# Patient Record
Sex: FEMALE | Race: BLACK OR AFRICAN AMERICAN | Employment: OTHER | URBAN - METROPOLITAN AREA
[De-identification: names, ages, dates, MRNs, and addresses within clinical notes are randomized per-mention and may not be internally consistent; named-entity substitution may affect disease eponyms.]

---

## 2017-12-03 ENCOUNTER — HOSPITAL ENCOUNTER (OUTPATIENT)
Facility: HOSPITAL | Age: 77
Setting detail: OBSERVATION
Discharge: HOME | End: 2017-12-04
Attending: EMERGENCY MEDICINE
Payer: MEDICARE

## 2017-12-03 DIAGNOSIS — E16.2 HYPOGLYCEMIA: Primary | ICD-10-CM

## 2017-12-03 PROCEDURE — 99220 INITIAL OBSERVATION CARE,LEVL III: CPT | Performed by: HOSPITALIST

## 2017-12-03 RX ORDER — FUROSEMIDE 20 MG/1
20 TABLET ORAL DAILY
Status: DISCONTINUED | OUTPATIENT
Start: 2017-12-04 | End: 2017-12-04

## 2017-12-03 RX ORDER — ONDANSETRON 2 MG/ML
4 INJECTION INTRAMUSCULAR; INTRAVENOUS EVERY 6 HOURS PRN
Status: DISCONTINUED | OUTPATIENT
Start: 2017-12-03 | End: 2017-12-04

## 2017-12-03 RX ORDER — PREGABALIN 75 MG/1
75 CAPSULE ORAL 2 TIMES DAILY
Status: DISCONTINUED | OUTPATIENT
Start: 2017-12-03 | End: 2017-12-04

## 2017-12-03 RX ORDER — PREGABALIN 75 MG/1
75 CAPSULE ORAL 2 TIMES DAILY
COMMUNITY

## 2017-12-03 RX ORDER — ACETAMINOPHEN 325 MG/1
650 TABLET ORAL EVERY 6 HOURS PRN
Status: DISCONTINUED | OUTPATIENT
Start: 2017-12-03 | End: 2017-12-04

## 2017-12-03 RX ORDER — AMLODIPINE BESYLATE 5 MG/1
5 TABLET ORAL 2 TIMES DAILY
COMMUNITY

## 2017-12-03 RX ORDER — ALENDRONATE SODIUM 35 MG/1
35 TABLET ORAL
COMMUNITY

## 2017-12-03 RX ORDER — GLIPIZIDE 10 MG/1
20 TABLET ORAL
COMMUNITY
End: 2017-12-04

## 2017-12-03 RX ORDER — SODIUM CHLORIDE 0.9 % (FLUSH) 0.9 %
3 SYRINGE (ML) INJECTION AS NEEDED
Status: DISCONTINUED | OUTPATIENT
Start: 2017-12-03 | End: 2017-12-04

## 2017-12-03 RX ORDER — CARVEDILOL 25 MG/1
25 TABLET ORAL 2 TIMES DAILY WITH MEALS
Status: DISCONTINUED | OUTPATIENT
Start: 2017-12-03 | End: 2017-12-04

## 2017-12-03 RX ORDER — AMLODIPINE BESYLATE 5 MG/1
5 TABLET ORAL 2 TIMES DAILY
Status: DISCONTINUED | OUTPATIENT
Start: 2017-12-03 | End: 2017-12-04

## 2017-12-03 RX ORDER — DEXTROSE MONOHYDRATE 25 G/50ML
50 INJECTION, SOLUTION INTRAVENOUS AS NEEDED
Status: DISCONTINUED | OUTPATIENT
Start: 2017-12-03 | End: 2017-12-04

## 2017-12-03 RX ORDER — CARVEDILOL 25 MG/1
25 TABLET ORAL 2 TIMES DAILY WITH MEALS
COMMUNITY

## 2017-12-03 RX ORDER — FUROSEMIDE 20 MG/1
20 TABLET ORAL DAILY
COMMUNITY

## 2017-12-03 RX ORDER — POTASSIUM CHLORIDE 20 MEQ/1
40 TABLET, EXTENDED RELEASE ORAL EVERY 4 HOURS
Status: COMPLETED | OUTPATIENT
Start: 2017-12-03 | End: 2017-12-04

## 2017-12-03 RX ORDER — DEXTROSE AND SODIUM CHLORIDE 5; .9 G/100ML; G/100ML
INJECTION, SOLUTION INTRAVENOUS ONCE
Status: COMPLETED | OUTPATIENT
Start: 2017-12-03 | End: 2017-12-04

## 2017-12-03 NOTE — ED INITIAL ASSESSMENT (HPI)
[facility]--patient with blood sugar 47 upon arrival, 1 amp d50 given.  AOx4 on arrival with no complaints    On pills and insulin for diabetes

## 2017-12-03 NOTE — ED PROVIDER NOTES
Patient Seen in: [facility] AND [facility] Emergency Department     History   Patient presents with:  Hypoglycemia (metabolic)    Stated Complaint: hypoglycemia    HPI    68year old female with history of diabetes on long-acting insulin as well as oral hypoglycem Head: Normocephalic and atraumatic. Head is without raccoon's eyes, without right periorbital erythema and without left periorbital erythema.    Nose: Nose normal.   Mouth/Throat: Mucous membranes are normal. Mucous membranes are not pale and not cyanotic DIFFERENTIAL WITH PLATELET.   Procedure                               Abnormality         Status                     ---------                               -----------         ------                     CBC W/ DIFFERENTIAL[[id number]]          Abnormal change.     Diagnostic Considerations and Interpretation of abnormal or significant results:  ED Course as of Dec 03 1040  ------------------------------------------------------------    Impression:  After review and interpretation of the above emergency de

## 2017-12-04 VITALS
TEMPERATURE: 98 F | HEART RATE: 73 BPM | SYSTOLIC BLOOD PRESSURE: 133 MMHG | BODY MASS INDEX: 29.81 KG/M2 | HEIGHT: 66.5 IN | DIASTOLIC BLOOD PRESSURE: 64 MMHG | WEIGHT: 187.69 LBS | RESPIRATION RATE: 16 BRPM | OXYGEN SATURATION: 95 %

## 2017-12-04 PROCEDURE — 99217 OBSERVATION CARE DISCHARGE: CPT | Performed by: HOSPITALIST

## 2017-12-04 NOTE — PLAN OF CARE
Problem: Patient/Family Goals  Goal: Patient/Family Long Term Goal  Patient's Long Term Goal:   Interventions:  -   - See additional Care Plan goals for specific interventions   Outcome: Progressing      Comments: Patient presents with stable vital signs.

## 2017-12-04 NOTE — CM/SW NOTE
YOAN met with the patient at bedside. She lives in a 2 story home with stairs. Her son lives with her and she is independent with her care. We discussed Zee Pennington for diabetes management and she is declining at this time.   She did agreed to outpatient diabetes

## 2017-12-04 NOTE — H&P
[facility]    History & Physical    Avila Gloss Patient Status:  Observation    1940 MRN [id number]   Location [address] [facility] Attending Arvin Traore MD   Hosp Day # 0 PCP Jaison Cervantes     Date:  12/3/2017  Date of Admi carvedilol 25 MG Oral Tab Take 25 mg by mouth 2 (two) times daily with meals. furosemide 20 MG Oral Tab Take 20 mg by mouth daily. AmLODIPine Besylate 5 MG Oral Tab Take 5 mg by mouth 2 (two) times daily.    Alendronate Sodium 35 MG Oral Tab Take 35 m

## 2017-12-04 NOTE — PLAN OF CARE
Patient/Family Goals    • Patient/Family Long Term Goal Completed    • Patient/Family Short Term Goal Completed        Pt currently resting in bed; VSS; hypoglycemia incident this morning, has resolved and BS good.  Pt will be D/C home today via a private v

## 2017-12-04 NOTE — DIABETES ED
[facility] - [facility]    Diabetes Education  Note    Daron Boas Cox Patient Status:  Observation   1940 MRN [id number]  Location [facility] Attending Sarath Tavarez MD  Hosp Day # 0  [address]    Reason for Visit: Hypoglycem

## 2017-12-05 NOTE — DISCHARGE SUMMARY
[facility]    Discharge Summary    Eusebio Sheehan Patient Status:  Observation    1940 MRN [id number]   Location [facility] Attending No att. providers found   Hosp Day # 0 KORI Lujan     Date of Admission: 12/3/2 check her blood sugars at home. Pt also revealed that her  recently  so she has not been eating much. Pt given D5 IVF in ER. Sugars recovered now. Stop glipizide and lanagliptin  Lantus dose decreased from 60 qhs to 20 units qhs.   Pt to f/ Information     Eleazar Castro In 1 week.     Specialty:  Internal Medicine  Contact information:  [facility] [place]  [phone]                   Hospital Discharge Diagnoses: Hypoglycemia    TCM Diagnosis at discharge from [facility] (DP/SNF)

## 2022-09-09 ENCOUNTER — APPOINTMENT (OUTPATIENT)
Dept: GENERAL RADIOLOGY | Facility: HOSPITAL | Age: 82
End: 2022-09-09
Attending: EMERGENCY MEDICINE
Payer: MEDICARE

## 2022-09-09 ENCOUNTER — HOSPITAL ENCOUNTER (EMERGENCY)
Facility: HOSPITAL | Age: 82
Discharge: HOME | End: 2022-09-09
Attending: EMERGENCY MEDICINE
Payer: MEDICARE

## 2022-09-09 VITALS
BODY MASS INDEX: 21 KG/M2 | OXYGEN SATURATION: 99 % | TEMPERATURE: 98 F | DIASTOLIC BLOOD PRESSURE: 70 MMHG | RESPIRATION RATE: 18 BRPM | SYSTOLIC BLOOD PRESSURE: 170 MMHG | HEART RATE: 75 BPM | WEIGHT: 130 LBS

## 2022-09-09 DIAGNOSIS — S16.1XXA STRAIN OF NECK MUSCLE, INITIAL ENCOUNTER: Primary | ICD-10-CM

## 2022-09-09 LAB
ANION GAP SERPL CALC-SCNC: 4 MMOL/L (ref 0–18)
BUN BLD-MCNC: 27 MG/DL (ref 7–18)
BUN/CREAT SERPL: 16.9 (ref 10–20)
CALCIUM BLD-MCNC: 9.3 MG/DL (ref 8.5–10.1)
CHLORIDE SERPL-SCNC: 106 MMOL/L (ref 98–112)
CO2 SERPL-SCNC: 28 MMOL/L (ref 21–32)
CREAT BLD-MCNC: 1.6 MG/DL
GFR SERPLBLD BASED ON 1.73 SQ M-ARVRAT: 32 ML/MIN/1.73M2 (ref 60–?)
GLUCOSE BLD-MCNC: 232 MG/DL (ref 70–99)
HCT VFR BLD AUTO: 44.3 %
HGB BLD-MCNC: 14.3 G/DL
OSMOLALITY SERPL CALC.SUM OF ELEC: 299 MOSM/KG (ref 275–295)
SODIUM SERPL-SCNC: 138 MMOL/L (ref 136–145)
TROPONIN I HIGH SENSITIVITY: 19 NG/L

## 2022-09-09 PROCEDURE — 96374 THER/PROPH/DIAG INJ IV PUSH: CPT

## 2022-09-09 PROCEDURE — 84484 ASSAY OF TROPONIN QUANT: CPT | Performed by: EMERGENCY MEDICINE

## 2022-09-09 PROCEDURE — 93010 ELECTROCARDIOGRAM REPORT: CPT | Performed by: EMERGENCY MEDICINE

## 2022-09-09 PROCEDURE — 85018 HEMOGLOBIN: CPT | Performed by: EMERGENCY MEDICINE

## 2022-09-09 PROCEDURE — 85014 HEMATOCRIT: CPT | Performed by: EMERGENCY MEDICINE

## 2022-09-09 PROCEDURE — 80048 BASIC METABOLIC PNL TOTAL CA: CPT | Performed by: EMERGENCY MEDICINE

## 2022-09-09 PROCEDURE — 93005 ELECTROCARDIOGRAM TRACING: CPT

## 2022-09-09 PROCEDURE — 72050 X-RAY EXAM NECK SPINE 4/5VWS: CPT | Performed by: EMERGENCY MEDICINE

## 2022-09-09 PROCEDURE — 99285 EMERGENCY DEPT VISIT HI MDM: CPT

## 2022-09-09 PROCEDURE — 99284 EMERGENCY DEPT VISIT MOD MDM: CPT

## 2022-09-09 RX ORDER — LIDOCAINE 50 MG/G
1 PATCH TOPICAL EVERY 24 HOURS
Qty: 5 PATCH | Refills: 0 | Status: SHIPPED | OUTPATIENT
Start: 2022-09-09 | End: 2022-09-14

## 2022-09-09 RX ORDER — ACETAMINOPHEN 325 MG/1
TABLET ORAL
Qty: 30 TABLET | Refills: 0 | Status: SHIPPED | OUTPATIENT
Start: 2022-09-09

## 2022-09-09 RX ORDER — LABETALOL HYDROCHLORIDE 5 MG/ML
15 INJECTION, SOLUTION INTRAVENOUS ONCE
Status: COMPLETED | OUTPATIENT
Start: 2022-09-09 | End: 2022-09-09

## 2022-09-09 RX ORDER — ACETAMINOPHEN 500 MG
1000 TABLET ORAL ONCE
Status: COMPLETED | OUTPATIENT
Start: 2022-09-09 | End: 2022-09-09

## 2022-09-09 NOTE — ED INITIAL ASSESSMENT (HPI)
Patient complains of right posterior neck pain that radiates to head x 3 days. Denies injury. A/o x 4, no deficits noted.

## 2023-07-01 ENCOUNTER — HOSPITAL ENCOUNTER (INPATIENT)
Facility: HOSPITAL | Age: 83
LOS: 6 days | Discharge: NURSING, REHAB OR LONG-TERM CARE | End: 2023-07-07
Attending: EMERGENCY MEDICINE | Admitting: INTERNAL MEDICINE
Payer: MEDICARE

## 2023-07-01 ENCOUNTER — APPOINTMENT (OUTPATIENT)
Dept: MRI IMAGING | Facility: HOSPITAL | Age: 83
End: 2023-07-01
Attending: INTERNAL MEDICINE
Payer: MEDICARE

## 2023-07-01 ENCOUNTER — APPOINTMENT (OUTPATIENT)
Dept: GENERAL RADIOLOGY | Facility: HOSPITAL | Age: 83
End: 2023-07-01
Attending: EMERGENCY MEDICINE
Payer: MEDICARE

## 2023-07-01 ENCOUNTER — APPOINTMENT (OUTPATIENT)
Dept: CT IMAGING | Facility: HOSPITAL | Age: 83
End: 2023-07-01
Attending: EMERGENCY MEDICINE
Payer: MEDICARE

## 2023-07-01 ENCOUNTER — APPOINTMENT (OUTPATIENT)
Dept: CV DIAGNOSTICS | Facility: HOSPITAL | Age: 83
End: 2023-07-01
Attending: STUDENT IN AN ORGANIZED HEALTH CARE EDUCATION/TRAINING PROGRAM
Payer: MEDICARE

## 2023-07-01 DIAGNOSIS — R09.89 SUSPECTED CEREBROVASCULAR ACCIDENT (CVA): Primary | ICD-10-CM

## 2023-07-01 LAB
ALBUMIN SERPL-MCNC: 3.6 G/DL (ref 3.4–5)
ALP LIVER SERPL-CCNC: 69 U/L
ALT SERPL-CCNC: 17 U/L
ANION GAP SERPL CALC-SCNC: 3 MMOL/L (ref 0–18)
APTT PPP: 30.6 SECONDS (ref 23.3–35.6)
AST SERPL-CCNC: 22 U/L (ref 15–37)
ATRIAL RATE: 73 BPM
BASOPHILS # BLD AUTO: 0.04 X10(3) UL (ref 0–0.2)
BASOPHILS NFR BLD AUTO: 0.8 %
BILIRUB DIRECT SERPL-MCNC: 0.2 MG/DL (ref 0–0.2)
BILIRUB SERPL-MCNC: 0.7 MG/DL (ref 0.1–2)
BUN BLD-MCNC: 24 MG/DL (ref 7–18)
BUN/CREAT SERPL: 17 (ref 10–20)
CALCIUM BLD-MCNC: 9.3 MG/DL (ref 8.5–10.1)
CHLORIDE SERPL-SCNC: 113 MMOL/L (ref 98–112)
CHOLEST SERPL-MCNC: 129 MG/DL (ref ?–200)
CO2 SERPL-SCNC: 28 MMOL/L (ref 21–32)
CREAT BLD-MCNC: 1.41 MG/DL
DEPRECATED RDW RBC AUTO: 47.7 FL (ref 35.1–46.3)
EOSINOPHIL # BLD AUTO: 0.14 X10(3) UL (ref 0–0.7)
EOSINOPHIL NFR BLD AUTO: 2.9 %
ERYTHROCYTE [DISTWIDTH] IN BLOOD BY AUTOMATED COUNT: 13.6 % (ref 11–15)
EST. AVERAGE GLUCOSE BLD GHB EST-MCNC: 177 MG/DL (ref 68–126)
GFR SERPLBLD BASED ON 1.73 SQ M-ARVRAT: 37 ML/MIN/1.73M2 (ref 60–?)
GLUCOSE BLD-MCNC: 164 MG/DL (ref 70–99)
GLUCOSE BLDC GLUCOMTR-MCNC: 117 MG/DL (ref 70–99)
GLUCOSE BLDC GLUCOMTR-MCNC: 140 MG/DL (ref 70–99)
GLUCOSE BLDC GLUCOMTR-MCNC: 144 MG/DL (ref 70–99)
GLUCOSE BLDC GLUCOMTR-MCNC: 162 MG/DL (ref 70–99)
GLUCOSE BLDC GLUCOMTR-MCNC: 162 MG/DL (ref 70–99)
GLUCOSE BLDC GLUCOMTR-MCNC: 167 MG/DL (ref 70–99)
HBA1C MFR BLD: 7.8 % (ref ?–5.7)
HCT VFR BLD AUTO: 43.6 %
HDLC SERPL-MCNC: 53 MG/DL (ref 40–59)
HGB BLD-MCNC: 13.5 G/DL
IMM GRANULOCYTES # BLD AUTO: 0.01 X10(3) UL (ref 0–1)
IMM GRANULOCYTES NFR BLD: 0.2 %
INR BLD: 1.08 (ref 0.85–1.16)
LDLC SERPL CALC-MCNC: 56 MG/DL (ref ?–100)
LYMPHOCYTES # BLD AUTO: 1.74 X10(3) UL (ref 1–4)
LYMPHOCYTES NFR BLD AUTO: 35.4 %
MCH RBC QN AUTO: 29.2 PG (ref 26–34)
MCHC RBC AUTO-ENTMCNC: 31 G/DL (ref 31–37)
MCV RBC AUTO: 94.4 FL
MONOCYTES # BLD AUTO: 0.66 X10(3) UL (ref 0.1–1)
MONOCYTES NFR BLD AUTO: 13.4 %
NEUTROPHILS # BLD AUTO: 2.32 X10 (3) UL (ref 1.5–7.7)
NEUTROPHILS # BLD AUTO: 2.32 X10(3) UL (ref 1.5–7.7)
NEUTROPHILS NFR BLD AUTO: 47.3 %
NONHDLC SERPL-MCNC: 76 MG/DL (ref ?–130)
OSMOLALITY SERPL CALC.SUM OF ELEC: 306 MOSM/KG (ref 275–295)
P AXIS: 55 DEGREES
P-R INTERVAL: 180 MS
PLATELET # BLD AUTO: 246 10(3)UL (ref 150–450)
POTASSIUM SERPL-SCNC: 3.8 MMOL/L (ref 3.5–5.1)
PROT SERPL-MCNC: 7.9 G/DL (ref 6.4–8.2)
PROTHROMBIN TIME: 13.9 SECONDS (ref 11.6–14.8)
Q-T INTERVAL: 422 MS
QRS DURATION: 100 MS
QTC CALCULATION (BEZET): 464 MS
R AXIS: -37 DEGREES
RBC # BLD AUTO: 4.62 X10(6)UL
SODIUM SERPL-SCNC: 144 MMOL/L (ref 136–145)
T AXIS: 181 DEGREES
TRIGL SERPL-MCNC: 111 MG/DL (ref 30–149)
TROPONIN I HIGH SENSITIVITY: 48 NG/L
VENTRICULAR RATE: 73 BPM
VLDLC SERPL CALC-MCNC: 16 MG/DL (ref 0–30)
WBC # BLD AUTO: 4.9 X10(3) UL (ref 4–11)

## 2023-07-01 PROCEDURE — 99223 1ST HOSP IP/OBS HIGH 75: CPT | Performed by: STUDENT IN AN ORGANIZED HEALTH CARE EDUCATION/TRAINING PROGRAM

## 2023-07-01 PROCEDURE — 70498 CT ANGIOGRAPHY NECK: CPT | Performed by: EMERGENCY MEDICINE

## 2023-07-01 PROCEDURE — 70496 CT ANGIOGRAPHY HEAD: CPT | Performed by: EMERGENCY MEDICINE

## 2023-07-01 PROCEDURE — 72100 X-RAY EXAM L-S SPINE 2/3 VWS: CPT | Performed by: EMERGENCY MEDICINE

## 2023-07-01 PROCEDURE — 70551 MRI BRAIN STEM W/O DYE: CPT | Performed by: INTERNAL MEDICINE

## 2023-07-01 RX ORDER — ROSUVASTATIN CALCIUM 10 MG/1
10 TABLET, COATED ORAL DAILY
Status: DISCONTINUED | OUTPATIENT
Start: 2023-07-01 | End: 2023-07-07

## 2023-07-01 RX ORDER — CLOPIDOGREL BISULFATE 75 MG/1
75 TABLET ORAL DAILY
Status: DISCONTINUED | OUTPATIENT
Start: 2023-07-02 | End: 2023-07-07

## 2023-07-01 RX ORDER — CLOPIDOGREL BISULFATE 75 MG/1
300 TABLET ORAL ONCE
Status: COMPLETED | OUTPATIENT
Start: 2023-07-01 | End: 2023-07-01

## 2023-07-01 RX ORDER — ACETAMINOPHEN 325 MG/1
650 TABLET ORAL EVERY 6 HOURS PRN
Status: DISCONTINUED | OUTPATIENT
Start: 2023-07-01 | End: 2023-07-07

## 2023-07-01 RX ORDER — NEBIVOLOL 5 MG/1
5 TABLET ORAL DAILY
Status: DISCONTINUED | OUTPATIENT
Start: 2023-07-01 | End: 2023-07-04

## 2023-07-01 RX ORDER — TIZANIDINE 2 MG/1
2 TABLET ORAL ONCE
COMMUNITY

## 2023-07-01 RX ORDER — GLIPIZIDE 10 MG/1
10 TABLET ORAL DAILY
COMMUNITY
Start: 2023-06-21 | End: 2023-07-07

## 2023-07-01 RX ORDER — ASPIRIN 81 MG/1
81 TABLET ORAL DAILY
Status: DISCONTINUED | OUTPATIENT
Start: 2023-07-02 | End: 2023-07-07

## 2023-07-01 RX ORDER — ROSUVASTATIN CALCIUM 10 MG/1
10 TABLET, COATED ORAL DAILY
COMMUNITY
Start: 2023-04-29

## 2023-07-01 RX ORDER — TIZANIDINE 2 MG/1
2 TABLET ORAL EVERY 8 HOURS PRN
Status: DISCONTINUED | OUTPATIENT
Start: 2023-07-01 | End: 2023-07-07

## 2023-07-01 RX ORDER — HEPARIN SODIUM 5000 [USP'U]/ML
5000 INJECTION, SOLUTION INTRAVENOUS; SUBCUTANEOUS EVERY 12 HOURS SCHEDULED
Status: DISCONTINUED | OUTPATIENT
Start: 2023-07-01 | End: 2023-07-01

## 2023-07-01 RX ORDER — ERGOCALCIFEROL 1.25 MG/1
50000 CAPSULE ORAL WEEKLY
COMMUNITY
Start: 2023-06-23

## 2023-07-01 RX ORDER — DILTIAZEM HYDROCHLORIDE 240 MG/1
240 CAPSULE, COATED, EXTENDED RELEASE ORAL 2 TIMES DAILY
COMMUNITY
Start: 2023-04-04 | End: 2023-07-07

## 2023-07-01 RX ORDER — POTASSIUM CHLORIDE 20 MEQ/1
40 TABLET, EXTENDED RELEASE ORAL ONCE
Status: COMPLETED | OUTPATIENT
Start: 2023-07-01 | End: 2023-07-01

## 2023-07-01 RX ORDER — PREGABALIN 75 MG/1
75 CAPSULE ORAL 2 TIMES DAILY
Status: DISCONTINUED | OUTPATIENT
Start: 2023-07-01 | End: 2023-07-07

## 2023-07-01 RX ORDER — NEBIVOLOL 5 MG/1
5 TABLET ORAL DAILY
COMMUNITY
End: 2023-07-07

## 2023-07-01 RX ORDER — ASPIRIN 81 MG/1
324 TABLET, CHEWABLE ORAL ONCE
Status: COMPLETED | OUTPATIENT
Start: 2023-07-01 | End: 2023-07-01

## 2023-07-01 RX ORDER — DILTIAZEM HYDROCHLORIDE 240 MG/1
240 CAPSULE, COATED, EXTENDED RELEASE ORAL 2 TIMES DAILY
Status: DISCONTINUED | OUTPATIENT
Start: 2023-07-01 | End: 2023-07-04

## 2023-07-01 RX ORDER — HEPARIN SODIUM 5000 [USP'U]/ML
5000 INJECTION, SOLUTION INTRAVENOUS; SUBCUTANEOUS EVERY 12 HOURS SCHEDULED
Status: DISCONTINUED | OUTPATIENT
Start: 2023-07-01 | End: 2023-07-07

## 2023-07-01 NOTE — ED INITIAL ASSESSMENT (HPI)
Patient arrive from home via EMS after she fell out of bed approx. 4ft. Patient c/o dizziness and turned over and fell out of bed. Stated she felt her right leg feel numb.  Felt fine when she went to bed around 10 pm.

## 2023-07-01 NOTE — ED QUICK NOTES
INDRA Lawson [phone] 6/30/23. Pt. Worden dizzy this AM and rolled and fell out of bed. Stated her right leg felt numb and could not stand.

## 2023-07-01 NOTE — SLP NOTE
ADULT SWALLOWING EVALUATION    ASSESSMENT    ASSESSMENT/OVERALL IMPRESSION:  PPE REQUIRED. THIS THERAPIST WORE GLOVES AND MASK FOR DURATION OF EVALUATION. HANDS WASHED UPON ENTRANCE/EXIT. SLP BSSE orders received and acknowledged. A swallow evaluation warranted per stroke protocol. Pt afebrile with clear vocal quality, on room air, with oxygen saturation at 93%. Pt with no hx of dysphagia at [facility]. Pt positioned 90 degrees in bed, alert/cooperative, family present. Oral motor skills appear within functional limits. Pt presented with trials of hard solids and thin liquids via straw. Pt with adequate oral acceptance and bilabial seal across all trials. Pt with intact bite, mastication of solids, and timely A-P transit. Pt's swallow response appears timely with adequate hyolaryngeal elevation/excursion. No clinical signs of aspiration (e.g., immediate/delayed throat clear, immediate/delayed cough, wet vocal quality, increased O2 effort) observed across all trials. No CXR on this admission. Oxygen status remained stable t/o the entire evaluation. At this time, pt presents with functional oral and probable pharyngeal swallow stages. Recommend a regular diet and thin liquids with strict adherence to safe swallowing compensatory strategies. Results and recommendations reviewed with RN and family. PLAN: SLP to f/u x1 meal assessment, monitor imaging, and VFSS if clinically indicated         RECOMMENDATIONS   Diet Recommendations - Solids: Regular  Diet Recommendations - Liquids: Thin Liquids                        Compensatory Strategies Recommended: Slow rate  Aspiration Precautions: Upright position; Slow rate  Medication Administration Recommendations: No restrictions  Treatment Plan/Recommendations: Aspiration precautions;Communication evaluation (pending MRI results)  Discharge Recommendations/Plan: Undetermined    HISTORY   MEDICAL HISTORY  Reason for Referral: Stroke protocol    Problem List  Principal Problem:    Suspected cerebrovascular accident (CVA)      Past Medical History  Past Medical History:   Diagnosis Date    Atherosclerosis of coronary artery     Breast cancer ([facility] [address]     Diabetes ([facility] [address]     Essential hypertension        Prior Living Situation: Home with support  Diet Prior to Admission: Regular; Thin liquids  Precautions: Aspiration    Patient/Family Goals: did not state this session    SWALLOWING HISTORY  Current Diet Consistency: Regular; Thin liquids  Dysphagia History: none  Imaging Results: MRI ordered    SUBJECTIVE       OBJECTIVE   ORAL MOTOR EXAMINATION  Dentition: Functional  Symmetry: Within Functional Limits  Strength: Within Functional Limits  Tone: Within Functional Limits  Range of Motion: Within Functional Limits  Rate of Motion: Within Functional Limits    Voice Quality: Clear  Respiratory Status: Unlabored  Consistencies Trialed: Thin liquids; Hard solid  Method of Presentation: Staff/Clinician assistance;Self presentation;Straw  Patient Positioning: Upright;Midline    Oral Phase of Swallow: Within Functional Limits                      Pharyngeal Phase of Swallow: Within Functional Limits           (Please note: Silent aspiration cannot be evaluated clinically. Videofluoroscopic Swallow Study is required to rule-out silent aspiration.)    Esophageal Phase of Swallow: No complaints consistent with possible esophageal involvement  Comments: NA              GOALS  Goal #1 The patient will tolerate regular consistency and thin liquids without overt signs or symptoms of aspiration with 100 % accuracy over 1 session(s). In Progress   Goal #2 The patient/family/caregiver will demonstrate understanding and implementation of aspiration precautions and swallow strategies independently over 1 session(s).     In Progress     FOLLOW UP  Treatment Plan/Recommendations: Aspiration precautions;Communication evaluation (pending MRI results)  Number of Visits to Meet Established Goals: 1  Follow Up Needed (Documentation Required): Yes  SLP Follow-up Date: 07/03/23    Thank you for your referral.   If you have any questions, please contact Danielle Ruth, MICHELE Parisi. [place]  Pathologist  Phone Number Ext. [phone]

## 2023-07-01 NOTE — CM/SW NOTE
07/01/23 1500   CM/YOAN Referral Data   Referral Source Physician   Reason for Referral Discharge planning   Informant Patient;Daughter   Medical Hx   Does patient have an established PCP? Yes  (Morse Henrietta)   Patient Info   Advanced directives? No   Information provided? No   Patient's Current Mental Status at Time of Assessment Alert;Oriented   Patient's [address]   Number of Levels in Home 2   Patient lives with WILVER; Son   Patient Status Prior to Admission   Independent with ADLs and Mobility Yes   Discharge Needs   Anticipated D/C needs Acute rehab     SW received MDO for discharge planning. Sw met with pt and daughter at bedside. Pt provided above information. Pt reports that she is is independent with ADLs, no hx of HH. Hx of rehab- unsure if it was [facility] or Acute. Pt was informed of therapy recs- Acute. Pt was informed that PMR MD has to agree with PT recs, then insurance Neelima Alvaro is needed. Pt is hesitant at this time, but is only agreeable to PMR Consult to see if PMR is agreeable with PT recs. No referrals sent at this time per patients request. Pt is understanding that it up to insurance to approve or deny. SW will follow up with pt and daughter tomorrow in regards to outcome of PMR consult. RN made aware of PMR consult, order placed    Plan: Pending medical clearance, DC to [facility], *PMR consult,     YOAN/ARMANDO to remain available for support and/or discharge planning.      Anupama Edward, MSW, LSW   x [phone]

## 2023-07-01 NOTE — PHYSICAL THERAPY NOTE
PHYSICAL THERAPY EVALUATION - INPATIENT     Room Number: 330/330-A  Evaluation Date: 7/1/2023  Type of Evaluation: Initial   Physician Order: PT Eval and Treat    Presenting Problem: suspected CVA     Reason for Therapy: Mobility Dysfunction and Discharge Planning    PHYSICAL THERAPY ASSESSMENT     Patient is a 80year old female admitted 7/1/2023 for suspected CVA. Patient's current functional deficits include weakness of RLE, impaired sensation of RLE, impaired coordination, impaired balance, bed mobility, transfers, gait, and stair navigation, which are below the patient's pre-admission status. Patient will benefit from continued IP PT services to address these deficits in preparation for discharge. RN approved participation with physical therapy. Pt was received resting in bed and agreeable to activity. Daughter and sister were at bedside. Educated on role of PT and PT plan of care, goals for this session. Pt verbalized understanding. Oriented to self, place, and stated date was July 2; easily reoriented. Per pt and family in the room, pt is currently at baseline mental status and no cognitive or speech changes present. Bed mobility: Mod A x 1 for supine to sit, Mod A x 1 scoot to EOB; most assistance required for movement of RLE off the bed and for trunk support. Initially with posterior lean; CGA to SBA for static and dynamic sitting at EOB. Remained seated at EOB for up to 10 minutes for vitals monitoring and to acclimate to position change. Pt did c/o slight dizziness upon sitting; BP taken at this time was 174/79. While sitting at EOB pt demonstrated grossly 4/5 strength of LLE and grossly 1/5 strength of RLE. Max A x 2 to return to supine. Transfers: Max A x 2 for sit to/from stand, x 3 trials. On initial STS, attempted without assistive device; required max A x 2 and only able to achieve partial lift from bed, <5 seconds before returning to sitting at EOB.  After seated rest break, attempt 2 more trials of STS with RW. Verbal cues and demonstration provided on safe hand placement and body mechanics prior to transfer. Able to achieve full stand with Max A x 2 and remained standing approximately 15 seconds on each stand. Cues given for upright posture and head position. Significant R sided lean noted and R knee buckling; pt unaware. RN requested for pt to return to bed due to being transported to MRI in the near future. Pt was left resting in bed with needs within reach, bed alarm on, handoff to RN complete. The patient's Approx Degree of Impairment: 76.75% has been calculated based on documentation in the [facility] '6 clicks' Inpatient Basic Mobility Short Form. Research supports that patients with this level of impairment may benefit from LTAC however PT recommends DC to acute rehab as pt was independent prior to admission and is currently well below her baseline, will benefit from intense skilled therapies to maximize independence and safety and facilitate safe return to independent PLOF. DISCHARGE RECOMMENDATIONS  PT Discharge Recommendations: Acute rehabilitation    PLAN  PT Treatment Plan: Bed mobility; Body mechanics; Endurance; Coordination; Energy conservation;Patient education;Gait training;Stair training;Transfer training;Balance training;Neuromuscular re-educate  Rehab Potential : Good  Frequency (Obs): Daily       PHYSICAL THERAPY MEDICAL/SOCIAL HISTORY     Problem List  Principal Problem:    Suspected cerebrovascular accident (CVA)    HOME SITUATION  Home Situation  Type of Home: House  Home Layout: Multi-level  Stairs to Enter : 1  Stairs to Bedroom: 6 (5 down to basement)  Railing: Yes  Lives With: Family (son and adult grandson)  Drives: No  Patient Owned Equipment: Cane  Patient Regularly Uses: None     Prior Level of [place]: Independent with all ADLs and mobility with a cane.      SUBJECTIVE  \"I did a lot of physical therapy in the past and it didn't do anything\"     PHYSICAL THERAPY EXAMINATION     OBJECTIVE  Precautions: Bed/chair alarm;Limb alert - right;Aspiration  Fall Risk: High fall risk    WEIGHT BEARING RESTRICTION  none    PAIN ASSESSMENT  Rating:  (did not rate)  Location: RLE  Management Techniques: Activity promotion; Body mechanics;Repositioning;Breathing techniques    COGNITION  Overall Cognitive Status:  WFL - within functional limits    RANGE OF MOTION AND STRENGTH ASSESSMENT  Upper extremity ROM and strength are within functional limits. Lower extremity ROM is within functional limits. Lower extremity strength is within functional limits. BALANCE  Static Sitting: Fair  Dynamic Sitting: Fair -  Static Standing: Poor  Dynamic Standing: Poor -    ACTIVITY TOLERANCE  Pulse: 69  Heart Rate Source: Monitor  BP: (!) 174/79 (169/75 after activity)  BP Location: Left arm  BP Method: Automatic  Patient Position: Sitting    AM-PAC '6-Clicks' INPATIENT SHORT FORM - BASIC MOBILITY  How much difficulty does the patient currently have. .. Patient Difficulty: Turning over in bed (including adjusting bedclothes, sheets and blankets)?: A Lot   Patient Difficulty: Sitting down on and standing up from a chair with arms (e.g., wheelchair, bedside commode, etc.): A Lot   Patient Difficulty: Moving from lying on back to sitting on the side of the bed?: A Lot   How much help from another person does the patient currently need. ..    Help from Another: Moving to and from a bed to a chair (including a wheelchair)?: A Lot   Help from Another: Need to walk in hospital room?: Total   Help from Another: Climbing 3-5 steps with a railing?: Total     AM-PAC Score:  Raw Score: 10   Approx Degree of Impairment: 76.75%   Standardized Score (AM-PAC Scale): 32.29   CMS Modifier (G-Code): CL    FUNCTIONAL ABILITY STATUS  Functional Mobility/Gait Assessment  Gait Assistance: Not tested    Bed Mobility: Mod A     Transfers: Max A x 2     Exercise/Education Provided:  Bed mobility  Body mechanics  Energy conservation  Functional activity tolerated  Gait training  Posture  Transfer training    Patient End of Session: In bed;Needs met;Call light within reach;RN aware of session/findings; All patient questions and concerns addressed; Alarm set; Family present    CURRENT GOALS    Goals to be met by: 7/8/23  Patient Goal Patient's self-stated goal is: go home   Goal #1 Patient is able to demonstrate supine - sit EOB @ level: minimum assistance     Goal #1   Current Status    Goal #2 Patient is able to demonstrate transfers Sit to/from Stand at assistance level: moderate assistance with walker - rolling     Goal #2  Current Status    Goal #3 Patient is able to ambulate 10 feet with assist device: walker - rolling at assistance level: moderate assistance   Goal #3   Current Status    Goal #4 Stair goal TBD   Goal #4   Current Status    Goal #5 Patient to demonstrate independence with home activity/exercise instructions provided to patient in preparation for discharge.    Goal #5   Current Status    Goal #6    Goal #6  Current Status      Patient Evaluation Complexity Level:  History Moderate - 1 or 2 personal factors and/or co-morbidities   Examination of body systems Moderate - addressing a total of 3 or more elements   Clinical Presentation Moderate - Evolving   Clinical Decision Making Moderate Complexity       Therapeutic Activity: 24 minutes

## 2023-07-01 NOTE — PLAN OF CARE
Patient is A&Ox4, 2Lo2, lift assist. NIH daily continuing Q4 neuros. Unable to move R lower extremity. Pt had MRI done today pending results. Plan for an echo tomorrow. Call light within reach and fall precautions in place. Problem: Patient Centered Care  Goal: Patient preferences are identified and integrated in the patient's plan of care  Description: Interventions:  - What would you like us to know as we care for you?  From home with son  - Provide timely, complete, and accurate information to patient/family  - Incorporate patient and family knowledge, values, beliefs, and cultural backgrounds into the planning and delivery of care  - Encourage patient/family to participate in care and decision-making at the level they choose  - Honor patient and family perspectives and choices  Outcome: Progressing     Problem: Diabetes/Glucose Control  Goal: Glucose maintained within prescribed range  Description: INTERVENTIONS:  - Monitor Blood Glucose as ordered  - Assess for signs and symptoms of hyperglycemia and hypoglycemia  - Administer ordered medications to maintain glucose within target range  - Assess barriers to adequate nutritional intake and initiate nutrition consult as needed  - Instruct patient on self management of diabetes  Outcome: Progressing     Problem: Patient/Family Goals  Goal: Patient/Family Long Term Goal  Description: Patient's Long Term Goal: To go back home    Interventions:  - Follow medical regimen  - See additional Care Plan goals for specific interventions  Outcome: Progressing  Goal: Patient/Family Short Term Goal  Description: Patient's Short Term Goal: To gain strength back    Interventions:   - Follow medical regimen  - work with PT/OT   - See additional Care Plan goals for specific interventions  Outcome: Progressing     Problem: NEUROLOGICAL - ADULT  Goal: Achieves stable or improved neurological status  Description: INTERVENTIONS  - Assess for and report changes in neurological status  - Initiate measures to prevent increased intracranial pressure  - Maintain blood pressure and fluid volume within ordered parameters to optimize cerebral perfusion and minimize risk of hemorrhage  - Monitor temperature, glucose, and sodium.  Initiate appropriate interventions as ordered  Outcome: Progressing  Goal: Achieves maximal functionality and self care  Description: INTERVENTIONS  - Monitor swallowing and airway patency with patient fatigue and changes in neurological status  - Encourage and assist patient to increase activity and self care with guidance from PT/OT  - Encourage visually impaired, hearing impaired and aphasic patients to use assistive/communication devices  Outcome: Progressing     Problem: Impaired Functional Mobility  Goal: Achieve highest/safest level of mobility/gait  Description: Interventions:  - Assess patient's functional ability and stability  - Promote increasing activity/tolerance for mobility and gait  - Educate and engage patient/family in tolerated activity level and precautions  Outcome: Progressing     Problem: Impaired Activities of Daily Living  Goal: Achieve highest/safest level of independence in self care  Description: Interventions:  - Assess ability and encourage patient to participate in ADLs to maximize function  - Promote sitting position while performing ADLs such as feeding, grooming, and bathing  - Educate and encourage patient/family in tolerated functional activity level and precautions during self-care  Outcome: Progressing

## 2023-07-01 NOTE — PLAN OF CARE
Pt admitted from ED in no acute distress, A&Ox4, on RA. VSS. Neuro check abnormal w/ inability to move RLE and decreased sensation in RLE, responds to pain. Passed bedside swallow eval. Plan: neuro eval, q2 neuros, daily NIH    Call light within reach, fall precautions in place  Problem: Patient Centered Care  Goal: Patient preferences are identified and integrated in the patient's plan of care  Description: Interventions:  - What would you like us to know as we care for you? From home with son  - Provide timely, complete, and accurate information to patient/family  - Incorporate patient and family knowledge, values, beliefs, and cultural backgrounds into the planning and delivery of care  - Encourage patient/family to participate in care and decision-making at the level they choose  - Honor patient and family perspectives and choices  Outcome: Progressing     Problem: Diabetes/Glucose Control  Goal: Glucose maintained within prescribed range  Description: INTERVENTIONS:  - Monitor Blood Glucose as ordered  - Assess for signs and symptoms of hyperglycemia and hypoglycemia  - Administer ordered medications to maintain glucose within target range  - Assess barriers to adequate nutritional intake and initiate nutrition consult as needed  - Instruct patient on self management of diabetes  Outcome: Progressing     Problem: NEUROLOGICAL - ADULT  Goal: Achieves stable or improved neurological status  Description: INTERVENTIONS  - Assess for and report changes in neurological status  - Initiate measures to prevent increased intracranial pressure  - Maintain blood pressure and fluid volume within ordered parameters to optimize cerebral perfusion and minimize risk of hemorrhage  - Monitor temperature, glucose, and sodium.  Initiate appropriate interventions as ordered  Outcome: Progressing  Goal: Achieves maximal functionality and self care  Description: INTERVENTIONS  - Monitor swallowing and airway patency with patient fatigue and changes in neurological status  - Encourage and assist patient to increase activity and self care with guidance from PT/OT  - Encourage visually impaired, hearing impaired and aphasic patients to use assistive/communication devices  Outcome: Progressing     Problem: Impaired Functional Mobility  Goal: Achieve highest/safest level of mobility/gait  Description: Interventions:  - Assess patient's functional ability and stability  - Promote increasing activity/tolerance for mobility and gait  - Educate and engage patient/family in tolerated activity level and precautions  - Recommend use of total lift for transfers  - Recommend use of chair position in bed 3 times per day  Outcome: Progressing     Problem: Impaired Activities of Daily Living  Goal: Achieve highest/safest level of independence in self care  Description: Interventions:  - Assess ability and encourage patient to participate in ADLs to maximize function  - Promote sitting position while performing ADLs such as feeding, grooming, and bathing  - Educate and encourage patient/family in tolerated functional activity level and precautions during self-care  - Encourage patient to incorporate impaired side during daily activities to promote function  Outcome: Progressing

## 2023-07-01 NOTE — PROGRESS NOTES
07/01/23 1212   Clinical Encounter Type   Visited With Patient and family together   Continue Visiting No   Referral From Nurse   Referral To    Rastafari Encounters   Rastafari Needs Pastoral care brochure   Patient Spiritual Encounters   Spiritual Assessment Completed Yes   Family Spiritual Encounters   Family Coping Sadness   Taxonomy   Intended Effects Lessen anxiety   Methods Explore spiritual/Voodoo beliefs; Offer spiritual/Voodoo support   Interventions Acknowledge current situation; Ask guided questions; Ask guided questions about cultural and Voodoo values; Share words of hope and inspiration;Prayer for healing;Provide Voodoo music     When  arrived Pt was resting with family at the bedside.  provide attentive listening, compassionate touch, music and prayer at the bedside. Pt was informed that Spiritual Care support can be requested via an Epic consult.

## 2023-07-01 NOTE — ED QUICK NOTES
Orders for admission, patient is aware of plan and ready to go upstairs. Any questions, please call ED RN Radha at extension [phone]. Patient Covid vaccination status: Partially vaccinated     COVID Test Ordered in ED: None    COVID Suspicion at Admission: N/A    Running Infusions:  None    Mental Status/LOC at time of transport: A&Ox4. Right sided weakness with right leg unable to move.      Other pertinent information:   CIWA score: N/A   NIH score:  4

## 2023-07-02 PROBLEM — R29.898 RIGHT LEG WEAKNESS: Status: ACTIVE | Noted: 2023-07-02

## 2023-07-02 PROBLEM — I63.9 ACUTE CVA (CEREBROVASCULAR ACCIDENT) (HCC): Status: ACTIVE | Noted: 2023-07-02

## 2023-07-02 LAB
GLUCOSE BLDC GLUCOMTR-MCNC: 105 MG/DL (ref 70–99)
GLUCOSE BLDC GLUCOMTR-MCNC: 223 MG/DL (ref 70–99)
GLUCOSE BLDC GLUCOMTR-MCNC: 254 MG/DL (ref 70–99)
GLUCOSE BLDC GLUCOMTR-MCNC: 282 MG/DL (ref 70–99)
POTASSIUM SERPL-SCNC: 3.8 MMOL/L (ref 3.5–5.1)

## 2023-07-02 PROCEDURE — 99233 SBSQ HOSP IP/OBS HIGH 50: CPT | Performed by: STUDENT IN AN ORGANIZED HEALTH CARE EDUCATION/TRAINING PROGRAM

## 2023-07-02 RX ORDER — POTASSIUM CHLORIDE 20 MEQ/1
40 TABLET, EXTENDED RELEASE ORAL ONCE
Status: COMPLETED | OUTPATIENT
Start: 2023-07-02 | End: 2023-07-02

## 2023-07-02 NOTE — PLAN OF CARE
No acute changes overnight. Problem: Patient Centered Care  Goal: Patient preferences are identified and integrated in the patient's plan of care  Description: Interventions:  - What would you like us to know as we care for you?  From home with son  - Provide timely, complete, and accurate information to patient/family  - Incorporate patient and family knowledge, values, beliefs, and cultural backgrounds into the planning and delivery of care  - Encourage patient/family to participate in care and decision-making at the level they choose  - Honor patient and family perspectives and choices  Outcome: Progressing     Problem: Diabetes/Glucose Control  Goal: Glucose maintained within prescribed range  Description: INTERVENTIONS:  - Monitor Blood Glucose as ordered  - Assess for signs and symptoms of hyperglycemia and hypoglycemia  - Administer ordered medications to maintain glucose within target range  - Assess barriers to adequate nutritional intake and initiate nutrition consult as needed  - Instruct patient on self management of diabetes  Outcome: Progressing     Problem: Patient/Family Goals  Goal: Patient/Family Long Term Goal  Description: Patient's Long Term Goal: To go back home    Interventions:  - Follow medical regimen  - See additional Care Plan goals for specific interventions  Outcome: Progressing  Goal: Patient/Family Short Term Goal  Description: Patient's Short Term Goal: To gain strength back    Interventions:   - Follow medical regimen  - work with PT/OT   - See additional Care Plan goals for specific interventions  Outcome: Progressing     Problem: NEUROLOGICAL - ADULT  Goal: Achieves stable or improved neurological status  Description: INTERVENTIONS  - Assess for and report changes in neurological status  - Initiate measures to prevent increased intracranial pressure  - Maintain blood pressure and fluid volume within ordered parameters to optimize cerebral perfusion and minimize risk of hemorrhage  - Monitor temperature, glucose, and sodium.  Initiate appropriate interventions as ordered  Outcome: Progressing  Goal: Achieves maximal functionality and self care  Description: INTERVENTIONS  - Monitor swallowing and airway patency with patient fatigue and changes in neurological status  - Encourage and assist patient to increase activity and self care with guidance from PT/OT  - Encourage visually impaired, hearing impaired and aphasic patients to use assistive/communication devices  Outcome: Progressing     Problem: Impaired Functional Mobility  Goal: Achieve highest/safest level of mobility/gait  Description: Interventions:  - Assess patient's functional ability and stability  - Promote increasing activity/tolerance for mobility and gait  - Educate and engage patient/family in tolerated activity level and precautions    Outcome: Progressing     Problem: Impaired Activities of Daily Living  Goal: Achieve highest/safest level of independence in self care  Description: Interventions:  - Assess ability and encourage patient to participate in ADLs to maximize function  - Promote sitting position while performing ADLs such as feeding, grooming, and bathing  - Educate and encourage patient/family in tolerated functional activity level and precautions during self-care    Outcome: Progressing

## 2023-07-02 NOTE — PROGRESS NOTES
Pt stated that she has a \"bump\" on her leg that hurts.  R outer part of pts thigh is a palpable bump that is painful to touch

## 2023-07-02 NOTE — PLAN OF CARE
AxOx4, RA, NSR, Cont of urine, incont of bowel. Max assist. RLE flaccid. Per Physical therapy pt was ortho static hypotension and dizzy when standing. Plan is SLP, ECHO, ACHS, Neuro Q4 and NIH   Problem: Patient Centered Care  Goal: Patient preferences are identified and integrated in the patient's plan of care  Description: Interventions:  - What would you like us to know as we care for you?  From home with son  - Provide timely, complete, and accurate information to patient/family  - Incorporate patient and family knowledge, values, beliefs, and cultural backgrounds into the planning and delivery of care  - Encourage patient/family to participate in care and decision-making at the level they choose  - Honor patient and family perspectives and choices  Outcome: Progressing     Problem: Diabetes/Glucose Control  Goal: Glucose maintained within prescribed range  Description: INTERVENTIONS:  - Monitor Blood Glucose as ordered  - Assess for signs and symptoms of hyperglycemia and hypoglycemia  - Administer ordered medications to maintain glucose within target range  - Assess barriers to adequate nutritional intake and initiate nutrition consult as needed  - Instruct patient on self management of diabetes  Outcome: Progressing     Problem: Patient/Family Goals  Goal: Patient/Family Long Term Goal  Description: Patient's Long Term Goal: To go back home    Interventions:  - Follow medical regimen  - See additional Care Plan goals for specific interventions  Outcome: Progressing  Goal: Patient/Family Short Term Goal  Description: Patient's Short Term Goal: To gain strength back    Interventions:   - Follow medical regimen  - work with PT/OT   - See additional Care Plan goals for specific interventions  Outcome: Progressing     Problem: NEUROLOGICAL - ADULT  Goal: Achieves stable or improved neurological status  Description: INTERVENTIONS  - Assess for and report changes in neurological status  - Initiate measures to prevent increased intracranial pressure  - Maintain blood pressure and fluid volume within ordered parameters to optimize cerebral perfusion and minimize risk of hemorrhage  - Monitor temperature, glucose, and sodium.  Initiate appropriate interventions as ordered  Outcome: Progressing  Goal: Achieves maximal functionality and self care  Description: INTERVENTIONS  - Monitor swallowing and airway patency with patient fatigue and changes in neurological status  - Encourage and assist patient to increase activity and self care with guidance from PT/OT  - Encourage visually impaired, hearing impaired and aphasic patients to use assistive/communication devices  Outcome: Progressing     Problem: Impaired Functional Mobility  Goal: Achieve highest/safest level of mobility/gait  Description: Interventions:  - Assess patient's functional ability and stability  - Promote increasing activity/tolerance for mobility and gait  - Educate and engage patient/family in tolerated activity level and precautions  - Recommend use of  henrique-walker for transfers and ambulation  Outcome: Progressing     Problem: Impaired Activities of Daily Living  Goal: Achieve highest/safest level of independence in self care  Description: Interventions:  - Assess ability and encourage patient to participate in ADLs to maximize function  - Promote sitting position while performing ADLs such as feeding, grooming, and bathing  - Educate and encourage patient/family in tolerated functional activity level and precautions during self-care  - Encourage patient to incorporate impaired side during daily activities to promote function  Outcome: Progressing

## 2023-07-03 ENCOUNTER — APPOINTMENT (OUTPATIENT)
Dept: CV DIAGNOSTICS | Facility: HOSPITAL | Age: 83
End: 2023-07-03
Attending: STUDENT IN AN ORGANIZED HEALTH CARE EDUCATION/TRAINING PROGRAM
Payer: MEDICARE

## 2023-07-03 LAB
ANION GAP SERPL CALC-SCNC: 7 MMOL/L (ref 0–18)
BASOPHILS # BLD AUTO: 0.05 X10(3) UL (ref 0–0.2)
BASOPHILS NFR BLD AUTO: 0.7 %
BUN BLD-MCNC: 27 MG/DL (ref 7–18)
BUN/CREAT SERPL: 17.9 (ref 10–20)
CALCIUM BLD-MCNC: 8.8 MG/DL (ref 8.5–10.1)
CHLORIDE SERPL-SCNC: 113 MMOL/L (ref 98–112)
CO2 SERPL-SCNC: 23 MMOL/L (ref 21–32)
CREAT BLD-MCNC: 1.51 MG/DL
DEPRECATED RDW RBC AUTO: 47 FL (ref 35.1–46.3)
EOSINOPHIL # BLD AUTO: 0.16 X10(3) UL (ref 0–0.7)
EOSINOPHIL NFR BLD AUTO: 2.1 %
ERYTHROCYTE [DISTWIDTH] IN BLOOD BY AUTOMATED COUNT: 13.8 % (ref 11–15)
GFR SERPLBLD BASED ON 1.73 SQ M-ARVRAT: 34 ML/MIN/1.73M2 (ref 60–?)
GLUCOSE BLD-MCNC: 131 MG/DL (ref 70–99)
GLUCOSE BLDC GLUCOMTR-MCNC: 134 MG/DL (ref 70–99)
GLUCOSE BLDC GLUCOMTR-MCNC: 157 MG/DL (ref 70–99)
GLUCOSE BLDC GLUCOMTR-MCNC: 212 MG/DL (ref 70–99)
GLUCOSE BLDC GLUCOMTR-MCNC: 253 MG/DL (ref 70–99)
HCT VFR BLD AUTO: 35.7 %
HGB BLD-MCNC: 11.5 G/DL
IMM GRANULOCYTES # BLD AUTO: 0.02 X10(3) UL (ref 0–1)
IMM GRANULOCYTES NFR BLD: 0.3 %
LYMPHOCYTES # BLD AUTO: 1.45 X10(3) UL (ref 1–4)
LYMPHOCYTES NFR BLD AUTO: 19.4 %
MCH RBC QN AUTO: 29.9 PG (ref 26–34)
MCHC RBC AUTO-ENTMCNC: 32.2 G/DL (ref 31–37)
MCV RBC AUTO: 92.7 FL
MONOCYTES # BLD AUTO: 0.98 X10(3) UL (ref 0.1–1)
MONOCYTES NFR BLD AUTO: 13.1 %
NEUTROPHILS # BLD AUTO: 4.81 X10 (3) UL (ref 1.5–7.7)
NEUTROPHILS # BLD AUTO: 4.81 X10(3) UL (ref 1.5–7.7)
NEUTROPHILS NFR BLD AUTO: 64.4 %
OSMOLALITY SERPL CALC.SUM OF ELEC: 303 MOSM/KG (ref 275–295)
PLATELET # BLD AUTO: 212 10(3)UL (ref 150–450)
POTASSIUM SERPL-SCNC: 4.4 MMOL/L (ref 3.5–5.1)
POTASSIUM SERPL-SCNC: 4.4 MMOL/L (ref 3.5–5.1)
RBC # BLD AUTO: 3.85 X10(6)UL
SODIUM SERPL-SCNC: 143 MMOL/L (ref 136–145)
WBC # BLD AUTO: 7.5 X10(3) UL (ref 4–11)

## 2023-07-03 PROCEDURE — 93306 TTE W/DOPPLER COMPLETE: CPT | Performed by: STUDENT IN AN ORGANIZED HEALTH CARE EDUCATION/TRAINING PROGRAM

## 2023-07-03 PROCEDURE — 99233 SBSQ HOSP IP/OBS HIGH 50: CPT | Performed by: STUDENT IN AN ORGANIZED HEALTH CARE EDUCATION/TRAINING PROGRAM

## 2023-07-03 NOTE — PLAN OF CARE
Pt AxOx4, RA, NSR, Cont bladder but incont of bowel. RLE still flaccid. Will cont to monitor    Problem: Patient Centered Care  Goal: Patient preferences are identified and integrated in the patient's plan of care  Description: Interventions:  - What would you like us to know as we care for you?  From home with son  - Provide timely, complete, and accurate information to patient/family  - Incorporate patient and family knowledge, values, beliefs, and cultural backgrounds into the planning and delivery of care  - Encourage patient/family to participate in care and decision-making at the level they choose  - Honor patient and family perspectives and choices  Outcome: Progressing     Problem: Diabetes/Glucose Control  Goal: Glucose maintained within prescribed range  Description: INTERVENTIONS:  - Monitor Blood Glucose as ordered  - Assess for signs and symptoms of hyperglycemia and hypoglycemia  - Administer ordered medications to maintain glucose within target range  - Assess barriers to adequate nutritional intake and initiate nutrition consult as needed  - Instruct patient on self management of diabetes  Outcome: Progressing     Problem: Patient/Family Goals  Goal: Patient/Family Long Term Goal  Description: Patient's Long Term Goal: To go back home    Interventions:  - Follow medical regimen  - See additional Care Plan goals for specific interventions  Outcome: Progressing  Goal: Patient/Family Short Term Goal  Description: Patient's Short Term Goal: To gain strength back    Interventions:   - Follow medical regimen  - work with PT/OT   - See additional Care Plan goals for specific interventions  Outcome: Progressing     Problem: NEUROLOGICAL - ADULT  Goal: Achieves stable or improved neurological status  Description: INTERVENTIONS  - Assess for and report changes in neurological status  - Initiate measures to prevent increased intracranial pressure  - Maintain blood pressure and fluid volume within ordered parameters to optimize cerebral perfusion and minimize risk of hemorrhage  - Monitor temperature, glucose, and sodium.  Initiate appropriate interventions as ordered  Outcome: Progressing  Goal: Achieves maximal functionality and self care  Description: INTERVENTIONS  - Monitor swallowing and airway patency with patient fatigue and changes in neurological status  - Encourage and assist patient to increase activity and self care with guidance from PT/OT  - Encourage visually impaired, hearing impaired and aphasic patients to use assistive/communication devices  Outcome: Progressing     Problem: Impaired Functional Mobility  Goal: Achieve highest/safest level of mobility/gait  Description: Interventions:  - Assess patient's functional ability and stability  - Promote increasing activity/tolerance for mobility and gait  - Educate and engage patient/family in tolerated activity level and precautions    Outcome: Progressing     Problem: Impaired Activities of Daily Living  Goal: Achieve highest/safest level of independence in self care  Description: Interventions:  - Assess ability and encourage patient to participate in ADLs to maximize function  - Promote sitting position while performing ADLs such as feeding, grooming, and bathing  - Educate and encourage patient/family in tolerated functional activity level and precautions during self-care    Outcome: Progressing

## 2023-07-03 NOTE — PHYSICAL THERAPY NOTE
PHYSICAL THERAPY TREATMENT NOTE - INPATIENT     Room Number: 330/330-A       Presenting Problem: suspected CVA    Problem List  Principal Problem:    Suspected cerebrovascular accident (CVA)  Active Problems:    Acute CVA (cerebrovascular accident) ([address]    Right leg weakness    PHYSICAL THERAPY ASSESSMENT   RN approved participation with physical therapy. Pt was received sitting in chair and agreeable to activity. Daughter at bedside. Educated on role of PT and PT plan of care, goals for this session. Pt verbalized understanding. Transfers: Max A x 2 for STS from chair with RW. R knee buckling noted, significant R sided lean. Therapist required to block R knee while standing. Max A x 2 for standing balance. Standing trial x 3, able to tolerate standing for up to 2 minutes each time then required rest break. Cues given to contract glutes and quads and focus on midline positioning. On 3rd stand, able to stand 3 minutes with lateral weight shifting R and L, with max A x 2 and RW. Max A x 2 for SPT to rolling toilet chair. Pt was taken to bathroom and left sitting on rolling chair with call light in reach, RN and PCT aware, daughter present to supervise pt. The patient's Approx Degree of Impairment: 81.38% has been calculated based on documentation in the [facility] '6 clicks' Inpatient Basic Mobility Short Form. Research supports that patients with this level of impairment may benefit from LTAC however PT recommends DC to acute rehab. DISCHARGE RECOMMENDATIONS  PT Discharge Recommendations: Acute rehabilitation     PLAN  PT Treatment Plan: Body mechanics; Bed mobility; Endurance; Patient education; Energy conservation;Gait training;Stair training;Transfer training;Balance training;Strengthening;Neuromuscular re-educate  Frequency (Obs): Daily    SUBJECTIVE  Agreeable to activity.      OBJECTIVE  Precautions: Bed/chair alarm;Limb alert - right;Aspiration    WEIGHT BEARING RESTRICTION  none    PAIN ASSESSMENT Ratin  Location: R thigh (\"bump\" noted, RN aware)  Management Techniques: Activity promotion; Body mechanics;Repositioning    BALANCE  Static Sitting: Good  Dynamic Sitting: Fair +  Static Standing: Poor  Dynamic Standing: Poor -    ACTIVITY TOLERANCE  Pulse: 61  Heart Rate Source: Monitor  BP: 129/54 (Sitting up in chair: 124/58; Standin/75)  BP Location: Left arm  BP Method: Automatic  Patient Position: Semi-Elmore     O2 WALK  Oxygen Therapy  SPO2% on Room Air at Rest: 95    AM-PAC '6-Clicks' INPATIENT SHORT FORM - BASIC MOBILITY  How much difficulty does the patient currently have. .. Patient Difficulty: Turning over in bed (including adjusting bedclothes, sheets and blankets)?: A Lot   Patient Difficulty: Sitting down on and standing up from a chair with arms (e.g., wheelchair, bedside commode, etc.): A Lot   Patient Difficulty: Moving from lying on back to sitting on the side of the bed?: A Lot   How much help from another person does the patient currently need. .. Help from Another: Moving to and from a bed to a chair (including a wheelchair)?: Total   Help from Another: Need to walk in hospital room?: Total   Help from Another: Climbing 3-5 steps with a railing?: Total     AM-PAC Score:  Raw Score: 9   Approx Degree of Impairment: 81.38%   Standardized Score (AM-PAC Scale): 30.55   CMS Modifier (G-Code): CM    FUNCTIONAL ABILITY STATUS  Functional Mobility/Gait Assessment  Gait Assistance: Not tested    Patient End of Session: Needs met;RN aware of session/findings;Call light within reach; All patient questions and concerns addressed; Alarm set; Family present; In bathroom - nursing staff aware    CURRENT GOALS   Goals to be met by: 23  Patient Goal Patient's self-stated goal is: go home   Goal #1 Patient is able to demonstrate supine - sit EOB @ level: minimum assistance      Goal #1   Current Status  NT   Goal #2 Patient is able to demonstrate transfers Sit to/from Stand at assistance level: moderate assistance with walker - rolling      Goal #2  Current Status  Max A x 2 with RW    Goal #3 Patient is able to ambulate 10 feet with assist device: walker - rolling at assistance level: moderate assistance   Goal #3   Current Status NT   Goal #4 Stair goal TBD   Goal #4   Current Status     Goal #5 Patient to demonstrate independence with home activity/exercise instructions provided to patient in preparation for discharge.    Goal #5   Current Status In progress   Goal #6     Goal #6  Current Status       Therapeutic Activity: 24 minutes

## 2023-07-03 NOTE — PLAN OF CARE
Pt is alert & oriented x4. Currently on room air. No c/o pain throughout the night. Q4 hour neuro checks per orders. Safety precautions in place. Bladder scanned at 0530: 245 mL of urine noted. See flowsheets for details. Problem: Patient Centered Care  Goal: Patient preferences are identified and integrated in the patient's plan of care  Description: Interventions:  - What would you like us to know as we care for you?  From home with son  - Provide timely, complete, and accurate information to patient/family  - Incorporate patient and family knowledge, values, beliefs, and cultural backgrounds into the planning and delivery of care  - Encourage patient/family to participate in care and decision-making at the level they choose  - Honor patient and family perspectives and choices  Outcome: Progressing     Problem: Diabetes/Glucose Control  Goal: Glucose maintained within prescribed range  Description: INTERVENTIONS:  - Monitor Blood Glucose as ordered  - Assess for signs and symptoms of hyperglycemia and hypoglycemia  - Administer ordered medications to maintain glucose within target range  - Assess barriers to adequate nutritional intake and initiate nutrition consult as needed  - Instruct patient on self management of diabetes  Outcome: Progressing     Problem: Patient/Family Goals  Goal: Patient/Family Long Term Goal  Description: Patient's Long Term Goal: To go back home    Interventions:  - Follow medical regimen  - See additional Care Plan goals for specific interventions  Outcome: Progressing  Goal: Patient/Family Short Term Goal  Description: Patient's Short Term Goal: To gain strength back    Interventions:   - Follow medical regimen  - work with PT/OT   - See additional Care Plan goals for specific interventions  Outcome: Progressing     Problem: NEUROLOGICAL - ADULT  Goal: Achieves stable or improved neurological status  Description: INTERVENTIONS  - Assess for and report changes in neurological status  - Initiate measures to prevent increased intracranial pressure  - Maintain blood pressure and fluid volume within ordered parameters to optimize cerebral perfusion and minimize risk of hemorrhage  - Monitor temperature, glucose, and sodium.  Initiate appropriate interventions as ordered  Outcome: Progressing  Goal: Achieves maximal functionality and self care  Description: INTERVENTIONS  - Monitor swallowing and airway patency with patient fatigue and changes in neurological status  - Encourage and assist patient to increase activity and self care with guidance from PT/OT  - Encourage visually impaired, hearing impaired and aphasic patients to use assistive/communication devices  Outcome: Progressing     Problem: Impaired Functional Mobility  Goal: Achieve highest/safest level of mobility/gait  Description: Interventions:  - Assess patient's functional ability and stability  - Promote increasing activity/tolerance for mobility and gait  - Educate and engage patient/family in tolerated activity level and precautions  Outcome: Progressing     Problem: Impaired Activities of Daily Living  Goal: Achieve highest/safest level of independence in self care  Description: Interventions:  - Assess ability and encourage patient to participate in ADLs to maximize function  - Promote sitting position while performing ADLs such as feeding, grooming, and bathing  - Educate and encourage patient/family in tolerated functional activity level and precautions during self-care  - Encourage patient to incorporate impaired side during daily activities to promote function  Outcome: Progressing

## 2023-07-03 NOTE — CM/SW NOTE
10: 45AM  Per chart, pt would like to see/hear PMR Md rec prior to having referrals sent for Acute Rehab. SW sent message to justus James w/ [facility] to inquire about ETA of PMR eval. Per Jojo James, he will discuss w/ Md and confirm if she can review remotely for a recommendation. 02:45PM  Confirmed w/ liaison, PMR Md recommends Acute at time of DC. AR referral sent earlier as proactive measures. Obtained AR list of quality data via [employer]. Met w/ pt and pt's dtr Je Mccallum at bedside. Discussed AR and explained locations available. SW also explained next steps for choice and insurance auth. Informed them that due to July 4th Holiday, likely to not have insurance before Wednesday or Thursday. Questions in regards to possible LOS at Acute as well as transportation from [address] [place] [address] to chosen [address] [place] [address] facility discussed. Pt and dtr expressed understanding. They are currently interested in [facility] but will review the list to confirm decision. They have been provided w/ f/up instructions once choice has been made. Pt's family would like to transport pt to chosen facility at time of DC if safe/appropriate. Pt's dtr confirmed she can have another person to assist her w/ transfers if necessary. Need for DC:  PMR note  AR choice  Ins Auth    PLAN: Likely Acute Rehab - pending above & med clear       SW/CM to remain available for support and/or discharge planning.          Teresa Johns, MSW, [facility]

## 2023-07-04 LAB
GLUCOSE BLDC GLUCOMTR-MCNC: 118 MG/DL (ref 70–99)
GLUCOSE BLDC GLUCOMTR-MCNC: 150 MG/DL (ref 70–99)
GLUCOSE BLDC GLUCOMTR-MCNC: 154 MG/DL (ref 70–99)
GLUCOSE BLDC GLUCOMTR-MCNC: 231 MG/DL (ref 70–99)

## 2023-07-04 PROCEDURE — 99232 SBSQ HOSP IP/OBS MODERATE 35: CPT | Performed by: STUDENT IN AN ORGANIZED HEALTH CARE EDUCATION/TRAINING PROGRAM

## 2023-07-04 RX ORDER — CARVEDILOL 25 MG/1
25 TABLET ORAL 2 TIMES DAILY WITH MEALS
Status: DISCONTINUED | OUTPATIENT
Start: 2023-07-05 | End: 2023-07-07

## 2023-07-04 NOTE — PLAN OF CARE
Patient alert and oriented on room air with no complaints of pain. Q shift neuro completed. Family at bedside during beginning of shift. Call light in reach and safety measures implemented. PMR to see and decide on rehab. Problem: Patient Centered Care  Goal: Patient preferences are identified and integrated in the patient's plan of care  Description: Interventions:  - What would you like us to know as we care for you?  From home with son  - Provide timely, complete, and accurate information to patient/family  - Incorporate patient and family knowledge, values, beliefs, and cultural backgrounds into the planning and delivery of care  - Encourage patient/family to participate in care and decision-making at the level they choose  - Honor patient and family perspectives and choices  Outcome: Progressing     Problem: Diabetes/Glucose Control  Goal: Glucose maintained within prescribed range  Description: INTERVENTIONS:  - Monitor Blood Glucose as ordered  - Assess for signs and symptoms of hyperglycemia and hypoglycemia  - Administer ordered medications to maintain glucose within target range  - Assess barriers to adequate nutritional intake and initiate nutrition consult as needed  - Instruct patient on self management of diabetes  Outcome: Progressing     Problem: Patient/Family Goals  Goal: Patient/Family Long Term Goal  Description: Patient's Long Term Goal: To go back home    Interventions:  - Follow medical regimen  - See additional Care Plan goals for specific interventions  Outcome: Progressing  Goal: Patient/Family Short Term Goal  Description: Patient's Short Term Goal: To gain strength back    Interventions:   - Follow medical regimen  - work with PT/OT   - See additional Care Plan goals for specific interventions  Outcome: Progressing     Problem: NEUROLOGICAL - ADULT  Goal: Achieves stable or improved neurological status  Description: INTERVENTIONS  - Assess for and report changes in neurological status  - Initiate measures to prevent increased intracranial pressure  - Maintain blood pressure and fluid volume within ordered parameters to optimize cerebral perfusion and minimize risk of hemorrhage  - Monitor temperature, glucose, and sodium.  Initiate appropriate interventions as ordered  Outcome: Progressing  Goal: Achieves maximal functionality and self care  Description: INTERVENTIONS  - Monitor swallowing and airway patency with patient fatigue and changes in neurological status  - Encourage and assist patient to increase activity and self care with guidance from PT/OT  - Encourage visually impaired, hearing impaired and aphasic patients to use assistive/communication devices  Outcome: Progressing     Problem: Impaired Functional Mobility  Goal: Achieve highest/safest level of mobility/gait  Description: Interventions:  - Assess patient's functional ability and stability  - Promote increasing activity/tolerance for mobility and gait  - Educate and engage patient/family in tolerated activity level and precautions    Outcome: Progressing     Problem: Impaired Activities of Daily Living  Goal: Achieve highest/safest level of independence in self care  Description: Interventions:  - Assess ability and encourage patient to participate in ADLs to maximize function  - Promote sitting position while performing ADLs such as feeding, grooming, and bathing  - Educate and encourage patient/family in tolerated functional activity level and precautions during self-care  Outcome: Progressing

## 2023-07-04 NOTE — PLAN OF CARE
Genesis Kitchen is A&Ox4 on RA with no c/o of pain. NIH and Neuro-checks done per orders. Cardiology consult. See notes. Plan for stress test tomorrow. Consent signed in chart. Plan to discharge to Acute rehab per PMR recommendations. Problem: Patient Centered Care  Goal: Patient preferences are identified and integrated in the patient's plan of care  Description: Interventions:  - What would you like us to know as we care for you?  From home with son  - Provide timely, complete, and accurate information to patient/family  - Incorporate patient and family knowledge, values, beliefs, and cultural backgrounds into the planning and delivery of care  - Encourage patient/family to participate in care and decision-making at the level they choose  - Honor patient and family perspectives and choices  Outcome: Progressing     Problem: Diabetes/Glucose Control  Goal: Glucose maintained within prescribed range  Description: INTERVENTIONS:  - Monitor Blood Glucose as ordered  - Assess for signs and symptoms of hyperglycemia and hypoglycemia  - Administer ordered medications to maintain glucose within target range  - Assess barriers to adequate nutritional intake and initiate nutrition consult as needed  - Instruct patient on self management of diabetes  Outcome: Progressing     Problem: Patient/Family Goals  Goal: Patient/Family Long Term Goal  Description: Patient's Long Term Goal: To go back home    Interventions:  - Follow medical regimen  - See additional Care Plan goals for specific interventions  Outcome: Progressing  Goal: Patient/Family Short Term Goal  Description: Patient's Short Term Goal: To gain strength back    Interventions:   - Follow medical regimen  - work with PT/OT   - See additional Care Plan goals for specific interventions  Outcome: Progressing     Problem: NEUROLOGICAL - ADULT  Goal: Achieves stable or improved neurological status  Description: INTERVENTIONS  - Assess for and report changes in neurological status  - Initiate measures to prevent increased intracranial pressure  - Maintain blood pressure and fluid volume within ordered parameters to optimize cerebral perfusion and minimize risk of hemorrhage  - Monitor temperature, glucose, and sodium.  Initiate appropriate interventions as ordered  Outcome: Progressing  Goal: Achieves maximal functionality and self care  Description: INTERVENTIONS  - Monitor swallowing and airway patency with patient fatigue and changes in neurological status  - Encourage and assist patient to increase activity and self care with guidance from PT/OT  - Encourage visually impaired, hearing impaired and aphasic patients to use assistive/communication devices  Outcome: Progressing     Problem: Impaired Functional Mobility  Goal: Achieve highest/safest level of mobility/gait  Description: Interventions:  - Assess patient's functional ability and stability  - Promote increasing activity/tolerance for mobility and gait  - Educate and engage patient/family in tolerated activity level and precautions  - Recommend use of  RW for transfers and ambulation  Outcome: Progressing     Problem: Impaired Activities of Daily Living  Goal: Achieve highest/safest level of independence in self care  Description: Interventions:  - Assess ability and encourage patient to participate in ADLs to maximize function  - Promote sitting position while performing ADLs such as feeding, grooming, and bathing  - Educate and encourage patient/family in tolerated functional activity level and precautions during self-care  - Provide support under elbow of weak side to prevent shoulder subluxation  Outcome: Progressing

## 2023-07-05 ENCOUNTER — APPOINTMENT (OUTPATIENT)
Dept: NUCLEAR MEDICINE | Facility: HOSPITAL | Age: 83
End: 2023-07-05
Attending: INTERNAL MEDICINE
Payer: MEDICARE

## 2023-07-05 ENCOUNTER — APPOINTMENT (OUTPATIENT)
Dept: CV DIAGNOSTICS | Facility: HOSPITAL | Age: 83
End: 2023-07-05
Attending: INTERNAL MEDICINE
Payer: MEDICARE

## 2023-07-05 LAB
% OF MAX PREDICTED HR: 100 %
ANION GAP SERPL CALC-SCNC: 6 MMOL/L (ref 0–18)
BUN BLD-MCNC: 26 MG/DL (ref 7–18)
BUN/CREAT SERPL: 17.8 (ref 10–20)
CALCIUM BLD-MCNC: 9.1 MG/DL (ref 8.5–10.1)
CHLORIDE SERPL-SCNC: 112 MMOL/L (ref 98–112)
CO2 SERPL-SCNC: 23 MMOL/L (ref 21–32)
CREAT BLD-MCNC: 1.46 MG/DL
GFR SERPLBLD BASED ON 1.73 SQ M-ARVRAT: 36 ML/MIN/1.73M2 (ref 60–?)
GLUCOSE BLD-MCNC: 122 MG/DL (ref 70–99)
GLUCOSE BLDC GLUCOMTR-MCNC: 146 MG/DL (ref 70–99)
GLUCOSE BLDC GLUCOMTR-MCNC: 181 MG/DL (ref 70–99)
GLUCOSE BLDC GLUCOMTR-MCNC: 201 MG/DL (ref 70–99)
GLUCOSE BLDC GLUCOMTR-MCNC: 229 MG/DL (ref 70–99)
MAX DIASTOLIC BP: 77 MMHG
MAX HEART RATE: 78 BPM
MAX PREDICTED HEART RATE: 138 BPM
MAX SYSTOLIC BP: 164 MMHG
MAX WORK LOAD: 10
OSMOLALITY SERPL CALC.SUM OF ELEC: 298 MOSM/KG (ref 275–295)
POTASSIUM SERPL-SCNC: 4.1 MMOL/L (ref 3.5–5.1)
SODIUM SERPL-SCNC: 141 MMOL/L (ref 136–145)

## 2023-07-05 PROCEDURE — 78452 HT MUSCLE IMAGE SPECT MULT: CPT | Performed by: INTERNAL MEDICINE

## 2023-07-05 PROCEDURE — 93017 CV STRESS TEST TRACING ONLY: CPT | Performed by: INTERNAL MEDICINE

## 2023-07-05 PROCEDURE — 99232 SBSQ HOSP IP/OBS MODERATE 35: CPT | Performed by: STUDENT IN AN ORGANIZED HEALTH CARE EDUCATION/TRAINING PROGRAM

## 2023-07-05 RX ORDER — REGADENOSON 0.08 MG/ML
INJECTION, SOLUTION INTRAVENOUS
Status: COMPLETED
Start: 2023-07-05 | End: 2023-07-05

## 2023-07-05 RX ORDER — LOSARTAN POTASSIUM 25 MG/1
25 TABLET ORAL DAILY
Status: DISCONTINUED | OUTPATIENT
Start: 2023-07-05 | End: 2023-07-07

## 2023-07-05 NOTE — SLP NOTE
SPEECH/LANGUAGE/COGNITIVE EVALUATION - INPATIENT    Admission Date: 2023  Evaluation Date: 23    Reason for Referral: Stroke protocol    ASSESSMENT & PLAN   ASSESSMENT & IMPRESSION  Pt seen for speech language evaluation secondary to stroke protocol. MRI was positive for frontal lobe convexity cortical infarct. Collaborated with RN and SLE approved. The pt was seen at bedside with family present. The pt and family reports no significant change in speech and language ability. The pt seen at bedside while sitting upright in the chair in an alert state and oriented x4. Pt denies any pain. Oral motor [facility] examination completed. Face symmetrical at rest.  Adequate ROM/strength with labial, lingual, and buccal movements. The pt participated in conversational speech with speech intelligible to 100% accuracy. Intermittent hesitations in speech observed with the pt being able to complete the correct naming of the word with pausing to think of the word with 95% accuracy. Family reports hesitations in speech is not new and has been happening for a long time. No deficits with auditory comprehension. Per family report, the pt is at baseline with her speech and language skills. [facility], pt's spoken verbal expression level is 6/7. The Western Aphasia battery was completed. Results as follows:    Spontaneous Speech Content: 10/10  Spontaneous Speech Fluency: 9/10  Auditory Verbal Comprehension yes/no Questions: 10/10  Sequential Commands: 10/10  Repetition: 10/10  Object Namin/10    PLAN:  Speech therapy to sign off case secondary to pt and family report that minimal verbal expression deficits are baseline and the pt has been hesitating to get a word \"for a long time. \"  Continue to assess need for higher level cognitive testing as needed at next level of care.      Assessment(s) Administered: WAB Bedside Score     WAB Bedside Score:  (96/100)             Deficits Identified: Verbal expression (Minimal hesitations with word finding. Family reports hesitations with word finding is the pt's baseline status.)    Discharge Recommendations/Plan: Undetermined    Patient Experiencing Pain: No                         Prior Living Situation: Home with support  Prior Level of Function: Unknown      Imaging Results:   MRI   Narrative   PROCEDURE: MRI BRAIN (CPT=70551)     COMPARISON: [facility], CT STROKE CTA BRAIN/CTA NECK (WIV) (RHD=[id number]), 7/01/2023, 2:25 AM.     INDICATIONS: Acute cerebral vascular accident. Right lower extremity numbness. TECHNIQUE: A variety of imaging planes and parameters were utilized for visualization of suspected pathology. Images were performed without contrast.     FINDINGS:  CEREBRUM: 2.4 cm region of restricted diffusion in the left paramedian posterior frontal lobe involving the precentral gyrus extending up to the junction with the parietal lobe. Confluent pathologic signal in the periventricular white matter, centrum  semiovale and scattered regions in the subcortical white matter bilaterally. Chronic right thalamic and left basal ganglionic lacunar infarcts. .  CEREBELLUM: Chronic left paramedian cerebellar infarct No edema, hemorrhage, mass or acute infarct. BRAINSTEM: No edema, hemorrhage, mass or acute infarct. CSF SPACES: Ventricles, cisterns, and sulci are appropriate for age. No hydrocephalus, subarachnoid hemorrhage, or mass. SKULL: No mass or other significant visible lesion. SINUSES: Bilateral right greater than left mastoid effusions. No acute sinusitis. ORBITS: Previous bilateral cataract surgery. OTHER: Flow is present in the anterior and posterior circulation vessels. Impression   CONCLUSION:  1. Acute 2.4 cm left paramedian posterior frontal lobe convexity cortical infarct. 2. Advanced changes of chronic small vessel disease in both cerebral hemispheres.   3. Chronic left cerebellar lacunar infarct related to small vessel disease. 4. Bilateral mastoid effusions. Dictated by (CST): Lisa Britt MD on 7/01/2023 at 2:19 PM      Finalized by (CST): Lisa Britt MD on 7/01/2023 at 2:35 PM         Patient/Family Goals: To go to rehab    Interdisciplinary Communication: Discussed with RN  Discussed with physician    Patient, family and/or caregiver has been informed and has taken part in this evaluation and plan of treatment and have been advised and agree on the findings and plan of care. FOLLOW UP  Follow Up Needed (Documentation Required): No      Thank you for your referral.  If you have any questions please contact     Adriana Barrera MS/THELMA-SLP  Speech Language Pathologist  [address] [facility]  EXT.  [phone]

## 2023-07-05 NOTE — PLAN OF CARE
Pt a/ox4. [facility] neuro checks. Ra. Pure wick in place. Plan for stress test in am. Bed alarm on call light in reach. Problem: Patient Centered Care  Goal: Patient preferences are identified and integrated in the patient's plan of care  Description: Interventions:  - What would you like us to know as we care for you?  From home with son  - Provide timely, complete, and accurate information to patient/family  - Incorporate patient and family knowledge, values, beliefs, and cultural backgrounds into the planning and delivery of care  - Encourage patient/family to participate in care and decision-making at the level they choose  - Honor patient and family perspectives and choices  Outcome: Progressing     Problem: Diabetes/Glucose Control  Goal: Glucose maintained within prescribed range  Description: INTERVENTIONS:  - Monitor Blood Glucose as ordered  - Assess for signs and symptoms of hyperglycemia and hypoglycemia  - Administer ordered medications to maintain glucose within target range  - Assess barriers to adequate nutritional intake and initiate nutrition consult as needed  - Instruct patient on self management of diabetes  Outcome: Progressing     Problem: Patient/Family Goals  Goal: Patient/Family Long Term Goal  Description: Patient's Long Term Goal: To go back home    Interventions:  - Follow medical regimen  - See additional Care Plan goals for specific interventions  Outcome: Progressing  Goal: Patient/Family Short Term Goal  Description: Patient's Short Term Goal: To gain strength back    Interventions:   - Follow medical regimen  - work with PT/OT   - See additional Care Plan goals for specific interventions  Outcome: Progressing     Problem: NEUROLOGICAL - ADULT  Goal: Achieves stable or improved neurological status  Description: INTERVENTIONS  - Assess for and report changes in neurological status  - Initiate measures to prevent increased intracranial pressure  - Maintain blood pressure and fluid volume within ordered parameters to optimize cerebral perfusion and minimize risk of hemorrhage  - Monitor temperature, glucose, and sodium.  Initiate appropriate interventions as ordered  Outcome: Progressing  Goal: Achieves maximal functionality and self care  Description: INTERVENTIONS  - Monitor swallowing and airway patency with patient fatigue and changes in neurological status  - Encourage and assist patient to increase activity and self care with guidance from PT/OT  - Encourage visually impaired, hearing impaired and aphasic patients to use assistive/communication devices  Outcome: Progressing     Problem: Impaired Functional Mobility  Goal: Achieve highest/safest level of mobility/gait  Description: Interventions:  - Assess patient's functional ability and stability  - Promote increasing activity/tolerance for mobility and gait  - Educate and engage patient/family in tolerated activity level and precautions  Outcome: Progressing     Problem: Impaired Activities of Daily Living  Goal: Achieve highest/safest level of independence in self care  Description: Interventions:  - Assess ability and encourage patient to participate in ADLs to maximize function  - Promote sitting position while performing ADLs such as feeding, grooming, and bathing  - Educate and encourage patient/family in tolerated functional activity level and precautions during self-care  Outcome: Progressing

## 2023-07-05 NOTE — SLP NOTE
SPEECH DAILY NOTE - INPATIENT    ASSESSMENT & PLAN   ASSESSMENT  PPE REQUIRED. THIS THERAPIST WORE GLOVES AND DROPLET MASK FOR DURATION OF TX SESSION. HANDS WASHED UPON ENTRANCE/EXIT. SLP f/u for ongoing meal assessment per recommendations of general solids and thin liquids per BSE. RN reports pt tolerates diet and medication well with no overt clinical s/s aspiration. Pt denies any swallowing challenges. Pt positioned upright to 90 degrees in bedside chair. Pt afebrile, tolerating room air with oxygen status 94% prior to the start of oral trials. SLP reviewed aspiration precautions and safe swallowing compensatory strategies with the patient and her family. Patient acknowledged understanding and demonstrated independence with swallowing precautions. Good tolerance on general solids and thin liquids via open cup with timely oropharyngeal transit phases. Coordinated mastication on hard solids. Functional hyolaryngeal elevation/excursion to palpation during the swallow with no overt clinical signs/symptoms of aspiration. Pt trialed with thin liquids via straw with timely transit times. No overt clinical signs of aspiration (e.g., immediate/delayed throat clear, immediate/delayed cough, wet vocal quality, increased O2 effort) observed across all trials of thin liquids by straw. Oxygen status remained >94% throughout the entire session. Oral/buccal cavities clear of residue. Recommend to continue current diet of general solids and thin liquids via open cup/straws. Pt may use straws. PLAN: SLP to sign off case secondary to pt tolerating least restrictive diet and demonstrating independent use of swallowing precautions. Swallowing precautions and diet recommendations remain written on white board in the pt's room. RN alerted with results and recommendations. Diet Recommendations - Solids: Regular  Diet Recommendations - Liquids:  Thin Liquids    Compensatory Strategies Recommended: Slow rate  Aspiration Precautions: Upright position; Slow rate  Medication Administration Recommendations: No restrictions    Patient Experiencing Pain: No                Discharge Recommendations  Discharge Recommendations/Plan: Undetermined    Treatment Plan  Treatment Plan/Recommendations: Aspiration precautions;Communication evaluation (pending MRI results)    Interdisciplinary Communication: Discussed with RN  Plan posted at bedside  Recommendations posted at bedside            GOALS  Goal #1 The patient will tolerate regular consistency and thin liquids without overt signs or symptoms of aspiration with 100 % accuracy over 1 session(s). The pt tolerated po trials of hard solids and thin liquids without overt clinical signs of aspiration to 100% accuracy. Met   Goal #2 The patient/family/caregiver will demonstrate understanding and implementation of aspiration precautions and swallow strategies independently over 1 session(s). LP reviewed aspiration precautions and safe swallowing compensatory strategies with the patient and her family. Patient acknowledged understanding and demonstrated independence with swallowing precautions. Met     FOLLOW UP  Follow Up Needed (Documentation Required): No    Number of Visits to Meet Established Goals: 1    Session: 1 post BSSE    If you have any questions, please contact     Mellissa Hall MS/THELMA-SLP  Speech Language Pathologist  [address] [facility]  EXT.  [phone]

## 2023-07-05 NOTE — PLAN OF CARE
Christine Dominguez is A&Ox4 on RA with no c/o of pain. NIH and Neuro-checks done per orders. Patient had stress test done today. See results. Plan to discharge to Acute rehab per PMR recommendations. --> Copy of imaging disk place in patient chart. Problem: Patient Centered Care  Goal: Patient preferences are identified and integrated in the patient's plan of care  Description: Interventions:  - What would you like us to know as we care for you?  From home with son  - Provide timely, complete, and accurate information to patient/family  - Incorporate patient and family knowledge, values, beliefs, and cultural backgrounds into the planning and delivery of care  - Encourage patient/family to participate in care and decision-making at the level they choose  - Honor patient and family perspectives and choices  Outcome: Progressing     Problem: Diabetes/Glucose Control  Goal: Glucose maintained within prescribed range  Description: INTERVENTIONS:  - Monitor Blood Glucose as ordered  - Assess for signs and symptoms of hyperglycemia and hypoglycemia  - Administer ordered medications to maintain glucose within target range  - Assess barriers to adequate nutritional intake and initiate nutrition consult as needed  - Instruct patient on self management of diabetes  Outcome: Progressing     Problem: Patient/Family Goals  Goal: Patient/Family Long Term Goal  Description: Patient's Long Term Goal: To go back home    Interventions:  - Follow medical regimen  - See additional Care Plan goals for specific interventions  Outcome: Progressing  Goal: Patient/Family Short Term Goal  Description: Patient's Short Term Goal: To gain strength back    Interventions:   - Follow medical regimen  - work with PT/OT   - See additional Care Plan goals for specific interventions  Outcome: Progressing     Problem: NEUROLOGICAL - ADULT  Goal: Achieves stable or improved neurological status  Description: INTERVENTIONS  - Assess for and report changes in neurological status  - Initiate measures to prevent increased intracranial pressure  - Maintain blood pressure and fluid volume within ordered parameters to optimize cerebral perfusion and minimize risk of hemorrhage  - Monitor temperature, glucose, and sodium.  Initiate appropriate interventions as ordered  Outcome: Progressing  Goal: Achieves maximal functionality and self care  Description: INTERVENTIONS  - Monitor swallowing and airway patency with patient fatigue and changes in neurological status  - Encourage and assist patient to increase activity and self care with guidance from PT/OT  - Encourage visually impaired, hearing impaired and aphasic patients to use assistive/communication devices  Outcome: Progressing     Problem: Impaired Functional Mobility  Goal: Achieve highest/safest level of mobility/gait  Description: Interventions:  - Assess patient's functional ability and stability  - Promote increasing activity/tolerance for mobility and gait  - Educate and engage patient/family in tolerated activity level and precautions  - Recommend use of  RW for transfers and ambulation  Outcome: Progressing     Problem: Impaired Activities of Daily Living  Goal: Achieve highest/safest level of independence in self care  Description: Interventions:  - Assess ability and encourage patient to participate in ADLs to maximize function  - Promote sitting position while performing ADLs such as feeding, grooming, and bathing  - Educate and encourage patient/family in tolerated functional activity level and precautions during self-care  - Provide support under elbow of weak side to prevent shoulder subluxation  Outcome: Progressing

## 2023-07-05 NOTE — PHYSICAL THERAPY NOTE
PHYSICAL THERAPY TREATMENT NOTE - INPATIENT     Room Number: 330/330-A       Presenting Problem: acute left posterior frontal infarct    Problem List  Principal Problem:    Suspected cerebrovascular accident (CVA)  Active Problems:    Acute CVA (cerebrovascular accident) ([address]    Right leg weakness    PHYSICAL THERAPY ASSESSMENT   Pt cont to present with significant weakness right LE, impaired balance, impaired activity tolerance, and pt remains below her baseline for all fxal mobility. Patient will benefit from continued inpatient physical therapy to address above issues so that patient may achieve highest functional mobility level. Pt ok to see per rn, pt recd in bedside chair, daughter present, educated in role of PT, goals for session, ankle pumps for DVT prevention, importance of consistent mobility. Pt is alert, able to follow one step commands. Pt demonstrating 0/5 strength right LE, see OT note for right UE(wfl). Worked on sit to stand to rw, performed x 2, with concentration on wt shifting, sequencing for transfer, use of bilat UE and use of momentum. Max a of two to stand, with manual assist right knee to prevent buckling/hyperextension. Able to elicit trace right hip contraction, no knee control noted. Pt tolerated well, able to actively participate with therapy. Once in sitting, also noted trace right quad activity, hip flexion activity. Pt moving left LE without difficulty, 5/5 strength. Pt remained up in chair with dtr present, discussed POC, dc recommendations. Rn aware of session. The patient's Approx Degree of Impairment: 81.38% has been calculated based on documentation in the [facility] '6 clicks' Inpatient Basic Mobility Short Form. Research supports that patients with this level of impairment may benefit from LTAC however PT recommends DC to acute rehab. Pt was completely ind pta, is able to actively participate with therapies, and is motivated to return to of.      DISCHARGE RECOMMENDATIONS  PT Discharge Recommendations: Acute rehabilitation     PLAN  PT Treatment Plan: Body mechanics; Bed mobility; Endurance; Patient education; Energy conservation;Gait training;Stair training;Transfer training;Balance training;Strengthening;Neuromuscular re-educate  Frequency (Obs): Daily    SUBJECTIVE  Agreeable to activity. OBJECTIVE  Precautions: Bed/chair alarm;Aspiration    WEIGHT BEARING RESTRICTION  none    PAIN ASSESSMENT   Ratin  Location: denies at this time  Management Techniques: Activity promotion    BALANCE  Static Sitting: Good  Dynamic Sitting: Fair +  Static Standing: Poor  Dynamic Standing: Poor -    ACTIVITY TOLERANCE                       O2 WALK       AM-PAC '6-Clicks' INPATIENT SHORT FORM - BASIC MOBILITY  How much difficulty does the patient currently have. .. Patient Difficulty: Turning over in bed (including adjusting bedclothes, sheets and blankets)?: A Lot   Patient Difficulty: Sitting down on and standing up from a chair with arms (e.g., wheelchair, bedside commode, etc.): A Lot   Patient Difficulty: Moving from lying on back to sitting on the side of the bed?: A Lot   How much help from another person does the patient currently need. .. Help from Another: Moving to and from a bed to a chair (including a wheelchair)?: Total   Help from Another: Need to walk in hospital room?: Total   Help from Another: Climbing 3-5 steps with a railing?: Total     AM-PAC Score:  Raw Score: 9   Approx Degree of Impairment: 81.38%   Standardized Score (AM-PAC Scale): 30.55   CMS Modifier (G-Code): CM    FUNCTIONAL ABILITY STATUS  Functional Mobility/Gait Assessment  Gait Assistance: Not tested (NA, trace strength right LE)    Patient End of Session: Up in chair;Needs met;Call light within reach;RN aware of session/findings; All patient questions and concerns addressed; Family present    CURRENT GOALS   Goals to be met by: 23  Patient Goal Patient's self-stated goal is: go home   Goal #1 Patient is able to demonstrate supine - sit EOB @ level: minimum assistance      Goal #1   Current Status  NT   Goal #2 Patient is able to demonstrate transfers Sit to/from Stand at assistance level: moderate assistance with walker - rolling      Goal #2  Current Status  Max A x 2 with RW    Goal #3 Patient is able to ambulate 10 feet with assist device: walker - rolling at assistance level: moderate assistance   Goal #3   Current Status NT   Goal #4 Stair goal TBD   Goal #4   Current Status     Goal #5 Patient to demonstrate independence with home activity/exercise instructions provided to patient in preparation for discharge.    Goal #5   Current Status In progress   Goal #6     Goal #6  Current Status       Therapeutic Activity: 30 minutes

## 2023-07-05 NOTE — CM/SW NOTE
11: 45AM  Received Vmail from pt's dtr Ceci Small - their AR choice is [facility]. SW spoke to [facility] w/ [facility] - she will confirm bed availability at [facility] and update SW. Updated Vmail left for pt's dtr. SW to keep pt's dtr updated as bed availability is confirmed. 01:25PM  Received confirmation from [facility] can accept. [facility] to submit auth - pending PT/OT updates. Spoke to radiology and requested disc of images be completed and tubed to RN at station # [id number]. [facility] is aware. Radiology to call Rn when they get ready to send up disc. Need for DC:  1. PT/OT updates  2. Ins Auth    PLAN: [facility] - pending above & med clear      SW/CM to remain available for support and/or discharge planning.          Cheryle Merida, MSW, [facility]

## 2023-07-06 ENCOUNTER — PATIENT OUTREACH (OUTPATIENT)
Dept: CASE MANAGEMENT | Age: 83
End: 2023-07-06

## 2023-07-06 LAB
ANION GAP SERPL CALC-SCNC: 4 MMOL/L (ref 0–18)
BUN BLD-MCNC: 36 MG/DL (ref 7–18)
BUN/CREAT SERPL: 23.1 (ref 10–20)
CALCIUM BLD-MCNC: 9.1 MG/DL (ref 8.5–10.1)
CHLORIDE SERPL-SCNC: 113 MMOL/L (ref 98–112)
CO2 SERPL-SCNC: 25 MMOL/L (ref 21–32)
CREAT BLD-MCNC: 1.56 MG/DL
GFR SERPLBLD BASED ON 1.73 SQ M-ARVRAT: 33 ML/MIN/1.73M2 (ref 60–?)
GLUCOSE BLD-MCNC: 142 MG/DL (ref 70–99)
GLUCOSE BLDC GLUCOMTR-MCNC: 119 MG/DL (ref 70–99)
GLUCOSE BLDC GLUCOMTR-MCNC: 201 MG/DL (ref 70–99)
GLUCOSE BLDC GLUCOMTR-MCNC: 269 MG/DL (ref 70–99)
GLUCOSE BLDC GLUCOMTR-MCNC: 298 MG/DL (ref 70–99)
OSMOLALITY SERPL CALC.SUM OF ELEC: 305 MOSM/KG (ref 275–295)
POTASSIUM SERPL-SCNC: 4.2 MMOL/L (ref 3.5–5.1)
SARS-COV-2 RNA RESP QL NAA+PROBE: NOT DETECTED
SODIUM SERPL-SCNC: 142 MMOL/L (ref 136–145)

## 2023-07-06 PROCEDURE — 99232 SBSQ HOSP IP/OBS MODERATE 35: CPT | Performed by: STUDENT IN AN ORGANIZED HEALTH CARE EDUCATION/TRAINING PROGRAM

## 2023-07-06 RX ORDER — CLOPIDOGREL BISULFATE 75 MG/1
75 TABLET ORAL DAILY
Qty: 15 TABLET | Refills: 0 | Status: SHIPPED | OUTPATIENT
Start: 2023-07-06

## 2023-07-06 RX ORDER — ASPIRIN 81 MG/1
81 TABLET ORAL DAILY
Qty: 90 TABLET | Refills: 1 | Status: SHIPPED | OUTPATIENT
Start: 2023-07-06

## 2023-07-06 RX ORDER — CARVEDILOL 25 MG/1
25 TABLET ORAL 2 TIMES DAILY WITH MEALS
Qty: 60 TABLET | Refills: 2 | Status: SHIPPED | OUTPATIENT
Start: 2023-07-06

## 2023-07-06 RX ORDER — LOSARTAN POTASSIUM 25 MG/1
25 TABLET ORAL DAILY
Qty: 30 TABLET | Refills: 2 | Status: SHIPPED | OUTPATIENT
Start: 2023-07-07

## 2023-07-06 NOTE — PROGRESS NOTES
Calling pt daughter Selinaanyi Velasquez to schedule Neurology apt    Dr Colin Varner  [address]  [phone]  Apt made:   Wed 08/16 @11:10am  Confirmed w/pt daughterSelinaanyi Velasquez  Closing encounter

## 2023-07-06 NOTE — CM/SW NOTE
CM received call from Xin with [facility]. Insurance [employer] has been intiated by facility, no [employer] [place] has been obtained at this time. CM uploaded cardiology note from today as requested by facility. Patient discussed during nursing rounds. Anticipate patient is ready for discharge. Patient on room air, alert and oriented and daughter wishes to transport patient to facility at discharge. Per bedside RN, she confirmed this again with daughter and they decline transportation to be arranged. COVID swab requested by facility, bedside RN aware collection is needed prior to discharge. Update 1400: CM received call from Xin with [facility]. They have received insurance auth. Facility unable to accept patient this afternoon. Xin would like transportation arranged for tomorrow at 11:30 am  time. CM met with patient and daughter at bedside. Daughter originally wanted to drive patient to facility but patient is max assist x 2, left leg flaccid, fall risk, poor hip control. Daughter agreeable to ambulance at transport for patient safety. Xin called daughter to discuss they are requesting patient be transported by ambulance as well. [facility] ambulance arranged for 11:30 am  on 7/7. PCS done. Patient's RN aware. Patient/daughter aware. Dr Bertie Goodpasture states patient will be ready for dc tomorrow and is agreeable with discharge plan/time. Plan: [facility] pending medical clearance. Tentatively set up for ambulance  7/7 at 11:30 a. Insurance auth received by [facility]. **CM/SW to follow up with [facility] in the AM to confirm facility does not need anything else prior to transfer (also need neuro note stating they have cleared discharge).     Eric Kitchen, RN, BSN

## 2023-07-06 NOTE — PLAN OF CARE
Pt a/ox4, lethargic this PM. No complaints of pain or distress. Bed alarm on call light in reach. Neuro check done per order. Problem: Patient Centered Care  Goal: Patient preferences are identified and integrated in the patient's plan of care  Description: Interventions:  - What would you like us to know as we care for you?  From home with son  - Provide timely, complete, and accurate information to patient/family  - Incorporate patient and family knowledge, values, beliefs, and cultural backgrounds into the planning and delivery of care  - Encourage patient/family to participate in care and decision-making at the level they choose  - Honor patient and family perspectives and choices  Outcome: Progressing     Problem: Diabetes/Glucose Control  Goal: Glucose maintained within prescribed range  Description: INTERVENTIONS:  - Monitor Blood Glucose as ordered  - Assess for signs and symptoms of hyperglycemia and hypoglycemia  - Administer ordered medications to maintain glucose within target range  - Assess barriers to adequate nutritional intake and initiate nutrition consult as needed  - Instruct patient on self management of diabetes  Outcome: Progressing     Problem: Patient/Family Goals  Goal: Patient/Family Long Term Goal  Description: Patient's Long Term Goal: To go back home    Interventions:  - Follow medical regimen  - See additional Care Plan goals for specific interventions  Outcome: Progressing  Goal: Patient/Family Short Term Goal  Description: Patient's Short Term Goal: To gain strength back    Interventions:   - Follow medical regimen  - work with PT/OT   - See additional Care Plan goals for specific interventions  Outcome: Progressing     Problem: NEUROLOGICAL - ADULT  Goal: Achieves stable or improved neurological status  Description: INTERVENTIONS  - Assess for and report changes in neurological status  - Initiate measures to prevent increased intracranial pressure  - Maintain blood pressure and fluid volume within ordered parameters to optimize cerebral perfusion and minimize risk of hemorrhage  - Monitor temperature, glucose, and sodium.  Initiate appropriate interventions as ordered  Outcome: Progressing  Goal: Achieves maximal functionality and self care  Description: INTERVENTIONS  - Monitor swallowing and airway patency with patient fatigue and changes in neurological status  - Encourage and assist patient to increase activity and self care with guidance from PT/OT  - Encourage visually impaired, hearing impaired and aphasic patients to use assistive/communication devices  Outcome: Progressing     Problem: Impaired Functional Mobility  Goal: Achieve highest/safest level of mobility/gait  Description: Interventions:  - Assess patient's functional ability and stability  - Promote increasing activity/tolerance for mobility and gait  - Educate and engage patient/family in tolerated activity level and precautions  Outcome: Progressing     Problem: Impaired Activities of Daily Living  Goal: Achieve highest/safest level of independence in self care  Description: Interventions:  - Assess ability and encourage patient to participate in ADLs to maximize function  - Promote sitting position while performing ADLs such as feeding, grooming, and bathing  - Educate and encourage patient/family in tolerated functional activity level and precautions during self-care  Outcome: Progressing

## 2023-07-07 VITALS
HEART RATE: 70 BPM | BODY MASS INDEX: 19.69 KG/M2 | TEMPERATURE: 98 F | RESPIRATION RATE: 18 BRPM | WEIGHT: 124 LBS | DIASTOLIC BLOOD PRESSURE: 66 MMHG | OXYGEN SATURATION: 98 % | HEIGHT: 66.5 IN | SYSTOLIC BLOOD PRESSURE: 143 MMHG

## 2023-07-07 LAB — GLUCOSE BLDC GLUCOMTR-MCNC: 133 MG/DL (ref 70–99)

## 2023-07-07 NOTE — PLAN OF CARE
Patient is alert & oriented x4 on room air. Vital signs stable at this time. No acute changes noted throughout shift; patient slept. Fall precautions maintained - bed alarm on, bed locked in lowest position, call light and personal belongings within reach, non-skid socks in place. Frequent rounding by nursing staff. [facility] - transport scheduled for 11:30am.     Problem: Patient Centered Care  Goal: Patient preferences are identified and integrated in the patient's plan of care  Description: Interventions:  - What would you like us to know as we care for you?  From home with son  - Provide timely, complete, and accurate information to patient/family  - Incorporate patient and family knowledge, values, beliefs, and cultural backgrounds into the planning and delivery of care  - Encourage patient/family to participate in care and decision-making at the level they choose  - Honor patient and family perspectives and choices  Outcome: Progressing     Problem: Diabetes/Glucose Control  Goal: Glucose maintained within prescribed range  Description: INTERVENTIONS:  - Monitor Blood Glucose as ordered  - Assess for signs and symptoms of hyperglycemia and hypoglycemia  - Administer ordered medications to maintain glucose within target range  - Assess barriers to adequate nutritional intake and initiate nutrition consult as needed  - Instruct patient on self management of diabetes  Outcome: Progressing     Problem: Patient/Family Goals  Goal: Patient/Family Long Term Goal  Description: Patient's Long Term Goal: To go back home    Interventions:  - Follow medical regimen  - See additional Care Plan goals for specific interventions  Outcome: Progressing  Goal: Patient/Family Short Term Goal  Description: Patient's Short Term Goal: To gain strength back    Interventions:   - Follow medical regimen  - work with PT/OT   - See additional Care Plan goals for specific interventions  Outcome: Progressing     Problem: NEUROLOGICAL - ADULT  Goal: Achieves stable or improved neurological status  Description: INTERVENTIONS  - Assess for and report changes in neurological status  - Initiate measures to prevent increased intracranial pressure  - Maintain blood pressure and fluid volume within ordered parameters to optimize cerebral perfusion and minimize risk of hemorrhage  - Monitor temperature, glucose, and sodium.  Initiate appropriate interventions as ordered  Outcome: Progressing  Goal: Achieves maximal functionality and self care  Description: INTERVENTIONS  - Monitor swallowing and airway patency with patient fatigue and changes in neurological status  - Encourage and assist patient to increase activity and self care with guidance from PT/OT  - Encourage visually impaired, hearing impaired and aphasic patients to use assistive/communication devices  Outcome: Progressing     Problem: Impaired Functional Mobility  Goal: Achieve highest/safest level of mobility/gait  Description: Interventions:  - Assess patient's functional ability and stability  - Promote increasing activity/tolerance for mobility and gait  - Educate and engage patient/family in tolerated activity level and precautions  Outcome: Progressing     Problem: Impaired Activities of Daily Living  Goal: Achieve highest/safest level of independence in self care  Description: Interventions:  - Assess ability and encourage patient to participate in ADLs to maximize function  - Promote sitting position while performing ADLs such as feeding, grooming, and bathing  - Educate and encourage patient/family in tolerated functional activity level and precautions during self-care  Outcome: Progressing

## 2023-07-07 NOTE — CM/SW NOTE
Spoke with Radha Reyes from [facility] [place] ([phone]) who confirmed pt can be accepted for admission today. Discussed planned DC at 1130am.  She will call back with room assignment and phone number for RN report. Updated MAR sent via Deniz Munguia as requested. / to remain available for support and/or discharge planning. Obdulio Madrid LCSW  Discharge Planner  [phone]    Addendum:  Spoke with Vera from [facility] [place]. Pt will go to room 522. RN to call report to [phone]. Addendum:  Spoke with pt's RN. Pt will need Holter monitor placed at cardiology office prior to DC. Transport time changed to 1230pm  at MD offices. DC RN to bring pt to MD office for appointment.

## 2023-07-07 NOTE — DISCHARGE PLANNING
I called and gave report to nurse Faiban James at [facility] rehab facility. Patient's physical and history were relayed to nursing staff and included past medical history, diagnosis of CVA. Patient will be discharging at 12pm to [address] office to have holter monitor placed and then transport set for 12:30pm from [address] office as arranged by social work/case management. Phone number of primary nurse provided for follow up questions.        Jessica Perez, Discharge Leader [id number]

## 2023-07-12 ENCOUNTER — TELEPHONE (OUTPATIENT)
Dept: CARDIOLOGY CLINIC | Facility: HOSPITAL | Age: 83
End: 2023-07-12

## 2023-07-12 NOTE — TELEPHONE ENCOUNTER
----- Message from Elisha Mcginnis NP sent at 7/12/2023  8:21 AM CDT -----  Regarding: follow patient, will need f/u appt for chf, at acute rehab post CVA  Can we follow patient, will need f/u appt for chf, at acute rehab post CVA . At [facility], acute care rehab in [facility], Dr. Brissa Rincon pt.

## 2023-07-31 NOTE — TELEPHONE ENCOUNTER
Confirmed patient discharged from Acute rehab. Spoke with Daughter, assisted to schedule for Monday 8/7. BMP by RN. Understands she will get reminder call Thursday.

## 2023-08-16 ENCOUNTER — OFFICE VISIT (OUTPATIENT)
Dept: NEUROLOGY | Facility: CLINIC | Age: 83
End: 2023-08-16
Payer: MEDICARE

## 2023-08-16 DIAGNOSIS — I63.9 ACUTE CVA (CEREBROVASCULAR ACCIDENT) (HCC): Primary | ICD-10-CM

## 2023-08-16 PROCEDURE — 1159F MED LIST DOCD IN RCRD: CPT | Performed by: STUDENT IN AN ORGANIZED HEALTH CARE EDUCATION/TRAINING PROGRAM

## 2023-08-16 PROCEDURE — 1160F RVW MEDS BY RX/DR IN RCRD: CPT | Performed by: STUDENT IN AN ORGANIZED HEALTH CARE EDUCATION/TRAINING PROGRAM

## 2023-08-16 PROCEDURE — 99213 OFFICE O/P EST LOW 20 MIN: CPT | Performed by: STUDENT IN AN ORGANIZED HEALTH CARE EDUCATION/TRAINING PROGRAM

## 2023-08-16 NOTE — PROGRESS NOTES
[address]  [facility], [address] [place] [address]  [phone]              Date: August 16, 2023  Patient Name: Ramos Whitley   MRN: [id number]    Reason for Evaluation: Posthospital follow-up for infarct    HPI:     Ramos Whitley is a 80year old  female with a past medical history of breast cancer, diabetes, hypertension, CAD was seen in the clinic for post hospital follow-up for stroke. Patient presented after a fall on 7/1/2023. Patient fell off a bed,  LKW - night prior to presentation. As per patient she was trying to get out of the bed, when she realized that her right leg was weak, she tried to get up and fell down. She noted right leg numbness. As per patient she was at her baseline when she went to bed around 10 PM.  Patient complained of right leg numbness and weakness and was not able to stand on the right side. Stroke alert was called on presentation. CT head/CTA head and neck -did not show large vessel occlusion or stenosis. It showed 40% stenosis of proximal left ICA. Mild to moderate narrowing of right cavernous carotid. Generalized atherosclerosis. Moderate to advanced chronic small vessel ischemic changes. Chronic bilateral basal ganglionic infarct. 2.8 cm right thyroid nodule. Patient was not IV tenecteplase candidate as she was out of the time window for IV tPA  Patient was not an endovascular candidate as no large vessel occlusion or stenosis noted on CTA head and neck  Patient has a history of chronic lower back pain, was endorsing back pain 7-8/10 intensity, radiating to the left lower extremity, denies radicular symptoms in the right lower extremity. But endorses weakness and numbness in the right lower extremity. Denies any gait difficulty prior to the falls. Denies any problem with the bowel bladder function. X-ray lumbar spine-mild compression deformity of superior endplate of L2, age indeterminant. Osteopenia.   Grade 1 anterolisthesis of L4 on L5 measures 6 mm, mild degenerative changes of the lumbar spine. Of note patient was not on any antithrombotics/anticoagulants prior to presentation. MRI brain showed acute left paramedian posterior frontal lobe cortical infarct. LDL -56 and hemoglobin A1c -7.8  Patient was loaded with aspirin Plavix in the ER, with the plan to continue aspirin Plavix for 21 days followed by aspirin monotherapy. Patient was continued on Crestor 10 mg  2D Echocardiogram -7/3/2023- EF of 20 to 25%, severe diffuse LV hypokinesis, cavity size mildly increased. Grade 3 diastolic dysfunction. Left atrial volume moderately increased. Recommended cardiology consult for further evaluation of low EF and to rule out any cardioembolic etiology contributing to cortical infarct. Patient was evaluated by cardiology, Patient had a stress test, showed EF of 41% with fixed inferior wall defect. Cardiology recommended continued medical therapy and outpatient 14-day MCT for further evaluation of cardioembolic etiology. Patient was then discharged to inpatient rehab once her medical condition was stabilized. Since discharge patient completed her inpatient rehab, currently she is having physical therapy once a week at home. She endorses improvement in her right leg weakness, currently she is able to ambulate using a walker though still having some weakness in the right lower extremity. She had a heart monitor placed, will be following up with cardiologist in the next couple of weeks. She is taking aspirin Plavix and Crestor. Patient was instructed to take Plavix only for 21 days but still continued to take Plavix. She denies any new neurologic concerns today. OUTPATIENT MEDICATIONS  aspirin 81 MG Oral Tab EC, Take 1 tablet (81 mg total) by mouth daily. , Disp: 90 tablet, Rfl: 1  clopidogrel 75 MG Oral Tab, Take 1 tablet (75 mg total) by mouth daily. , Disp: 15 tablet, Rfl: 0  carvedilol 25 MG Oral Tab, Take 1 tablet (25 mg total) by mouth 2 (two) times daily with meals. , Disp: 60 tablet, Rfl: 2  losartan 25 MG Oral Tab, Take 1 tablet (25 mg total) by mouth daily. , Disp: 30 tablet, Rfl: 2  rosuvastatin 10 MG Oral Tab, Take 1 tablet (10 mg total) by mouth daily. , Disp: , Rfl:   ergocalciferol 1.25 MG (45083 UT) Oral Cap, Take 1 capsule (50,000 Units total) by mouth once a week., Disp: , Rfl:   Insulin Glargine (TOUJEO SOLOSTAR) 300 UNIT/ML Subcutaneous Solution Pen-injector, Inject 20 Units into the skin nightly., Disp: 1 pen, Rfl: 0    No current facility-administered medications on file prior to visit. MEDICAL HISTORY  Past Medical History:   Diagnosis Date    Atherosclerosis of coronary artery     Breast cancer ([facility] [address]     Diabetes ([facility] [address]     Essential hypertension        SURGICAL HISTORY  Past Surgical History:   Procedure Laterality Date    BREAST SURGERY         SOCIAL HISTORY  Social History    Socioeconomic History      Marital status:     Tobacco Use      Smoking status: Every Day        Packs/day: 0.50        Types: Cigarettes      Smokeless tobacco: Never    Vaping Use      Vaping Use: Never used    Substance and Sexual Activity      Alcohol use: Not Currently      Drug use: Not Currently      FAMILY HISTORY  No family history on file. ALLERGIES    Penicillins             RASH    REVIEW OF SYSTEMS:   13-point review of systems was done and is negative unless otherwise stated in HPI. PHYSICAL EXAM:   There were no vitals taken for this visit. General appearance: Well appearing, alert and in no acute distress  Skin: skin color normal.  No rashes or lesions. Head: Normocephalic, atraumatic.     Neurological exam:    Mental status   Alert and oriented   Attention/Concentration: intact attention on bedside test   Fund of knowledge: intact  Speech: no dysarthria or aphasia      Cranial nerves   II - visual fields intact to confrontation                                                III, IV, VI - extra-ocular muscles full: no pupillary defect; no MERCY, no nystagmus, no ptosis   V - normal facial sensation                                                               VII - normal facial symmetry                                                             VIII - intact hearing                                                                             IX, X - symmetrical palate                                                                  XI - symmetrical shoulder shrug                                                       XII - midline tongue without atrophy or fasciculation     Motor function  Normal muscle bulk and tone  Muscle strength: normal power 5/5 except for right lower extremity 4/5     Sensory function Intact to touch in bilateral upper and lower extremities. Cerebellar Finger to nose: no ataxia or dysmetria   No involuntary movements or tremors     Reflex function Intact 2+ DTR and symmetric. Positive Babinski on the right side     Gait                  Not assessed. Patient was on wheelchair, did not have a walker, currently can ambulate only with a walker         LABS/DATA:    LDL -56 and hemoglobin A1c -7.8         IMAGING:       CT head/CTA head and neck -did not show large vessel occlusion or stenosis. It showed 40% stenosis of proximal left ICA. Mild to moderate narrowing of right cavernous carotid. Generalized atherosclerosis. Moderate to advanced chronic small vessel ischemic changes. Chronic bilateral basal ganglionic infarct. 2.8 cm right thyroid nodule. MRI brain 7/1/2023  Acute 2.4 cm left paramedian posterior frontal lobe convexity cortical infarct. Advanced chronic small vessel ischemic changes. Chronic left cerebellar lacunar infarct. 2D Echocardiogram -7/3/2023- EF of 20 to 25%, severe diffuse LV hypokinesis, cavity size mildly increased. Grade 3 diastolic dysfunction. Left atrial volume moderately increased.         ASSESSMENT:  Rosa Maria Mari is a 80 year old  female with a past medical history of breast cancer, diabetes, hypertension, CAD was seen in the clinic for post hospital follow-up for stroke. # Acute left paramedian posterior frontal lobe cortical infarct. Patient presented with a fall from the bed and right leg numbness and weakness. Patient was not a thrombolysis candidate because she was out of IV tenecteplase window time  Patient did not receive thrombectomy because no large vessel occlusion noted on CTA head and neck     # Hypertension  # Diabetes  # History of breast cancer  # CAD      Plan:   Patient was loaded with aspirin Plavix in the ER, she was given aspirin 81 mg daily and Plavix 75 mg daily for 21 days followed by aspirin monotherapy. Instructed patient to continue taking only aspirin 81 mg. Continue Crestor 10 mg, goal LDL less than 70. Patient was on it prior to presentation. Patient's LDL was 56. During recent hospitalization, cardiology was consulted for further evaluation of low EF and to rule out any cardioembolic etiology contributing to cortical infarct. Patient was evaluated by cardiology, Patient had a stress test, showed EF of 41% with fixed inferior wall defect. Cardiology recommended continued medical therapy and outpatient 14-day MCT for further evaluation of cardioembolic etiology. Patient did have a heart monitor placed and have return the heart monitor but not had followed up with cardiologist.  She will be following up with cardiologist soon. Instructed patient to update the clinic with cardiology recommendations.        STROKE RISK FACTORS:   *Hypertension - Target blood pressure <140/90, <130/85 for high risk, normal 120/80  *Physical inactivity - target exercise at least 3 times per week  *Obesity - target ideal body weight and girth <35\" for women, <40\" for men  *Diabetes - Target <6.5-7%   *Smoking - Target smoking cessation  *Hyperlipidemia - Target total cholesterol < 200, Target LDL <100, < 70 for high risk, Target HDL >45 for men, >55 for women, Target triglycerides <150        We will follow-up in clinic as needed   Instructed patient to call the clinic if symptoms worsen or do not any new neurologic concerns or if develop any side effects. This note was prepared using [employer] voice recognition dictation software and as a result, errors may occur. When identified, these errors have been corrected.  While every attempt is made to correct errors during dictation, discrepancies may still exist    Chandni Albert MD,

## 2023-10-17 ENCOUNTER — HOSPITAL ENCOUNTER (INPATIENT)
Facility: HOSPITAL | Age: 83
LOS: 2 days | Discharge: HOME | End: 2023-10-19
Attending: EMERGENCY MEDICINE | Admitting: INTERNAL MEDICINE
Payer: MEDICARE

## 2023-10-17 ENCOUNTER — APPOINTMENT (OUTPATIENT)
Dept: GENERAL RADIOLOGY | Facility: HOSPITAL | Age: 83
End: 2023-10-17
Attending: EMERGENCY MEDICINE
Payer: MEDICARE

## 2023-10-17 ENCOUNTER — HOSPITAL ENCOUNTER (OUTPATIENT)
Facility: HOSPITAL | Age: 83
Setting detail: OBSERVATION
Discharge: HOME | End: 2023-10-19
Attending: EMERGENCY MEDICINE | Admitting: INTERNAL MEDICINE
Payer: MEDICARE

## 2023-10-17 DIAGNOSIS — R79.89 ELEVATED TROPONIN: ICD-10-CM

## 2023-10-17 DIAGNOSIS — I50.9 ACUTE ON CHRONIC CONGESTIVE HEART FAILURE, UNSPECIFIED HEART FAILURE TYPE (HCC): Primary | ICD-10-CM

## 2023-10-17 PROBLEM — E87.0 HYPERNATREMIA: Status: ACTIVE | Noted: 2023-10-17

## 2023-10-17 LAB
ANION GAP SERPL CALC-SCNC: 7 MMOL/L (ref 0–18)
ATRIAL RATE: 102 BPM
BASOPHILS # BLD AUTO: 0.03 X10(3) UL (ref 0–0.2)
BASOPHILS NFR BLD AUTO: 0.3 %
BUN BLD-MCNC: 25 MG/DL (ref 7–18)
BUN/CREAT SERPL: 17.2 (ref 10–20)
CALCIUM BLD-MCNC: 9.3 MG/DL (ref 8.5–10.1)
CHLORIDE SERPL-SCNC: 109 MMOL/L (ref 98–112)
CHOLEST SERPL-MCNC: 117 MG/DL (ref ?–200)
CO2 SERPL-SCNC: 29 MMOL/L (ref 21–32)
CREAT BLD-MCNC: 1.45 MG/DL
DEPRECATED RDW RBC AUTO: 50.4 FL (ref 35.1–46.3)
EGFRCR SERPLBLD CKD-EPI 2021: 36 ML/MIN/1.73M2 (ref 60–?)
EOSINOPHIL # BLD AUTO: 0.08 X10(3) UL (ref 0–0.7)
EOSINOPHIL NFR BLD AUTO: 0.9 %
ERYTHROCYTE [DISTWIDTH] IN BLOOD BY AUTOMATED COUNT: 14.7 % (ref 11–15)
GLUCOSE BLD-MCNC: 236 MG/DL (ref 70–99)
GLUCOSE BLDC GLUCOMTR-MCNC: 134 MG/DL (ref 70–99)
GLUCOSE BLDC GLUCOMTR-MCNC: 147 MG/DL (ref 70–99)
GLUCOSE BLDC GLUCOMTR-MCNC: 156 MG/DL (ref 70–99)
HCT VFR BLD AUTO: 34.5 %
HDLC SERPL-MCNC: 65 MG/DL (ref 40–59)
HGB BLD-MCNC: 10.5 G/DL
IMM GRANULOCYTES # BLD AUTO: 0.03 X10(3) UL (ref 0–1)
IMM GRANULOCYTES NFR BLD: 0.3 %
LDLC SERPL CALC-MCNC: 35 MG/DL (ref ?–100)
LYMPHOCYTES # BLD AUTO: 0.77 X10(3) UL (ref 1–4)
LYMPHOCYTES NFR BLD AUTO: 8.3 %
MCH RBC QN AUTO: 28.5 PG (ref 26–34)
MCHC RBC AUTO-ENTMCNC: 30.4 G/DL (ref 31–37)
MCV RBC AUTO: 93.5 FL
MONOCYTES # BLD AUTO: 0.71 X10(3) UL (ref 0.1–1)
MONOCYTES NFR BLD AUTO: 7.6 %
NEUTROPHILS # BLD AUTO: 7.68 X10 (3) UL (ref 1.5–7.7)
NEUTROPHILS # BLD AUTO: 7.68 X10(3) UL (ref 1.5–7.7)
NEUTROPHILS NFR BLD AUTO: 82.6 %
NONHDLC SERPL-MCNC: 52 MG/DL (ref ?–130)
NT-PROBNP SERPL-MCNC: ABNORMAL PG/ML (ref ?–450)
OSMOLALITY SERPL CALC.SUM OF ELEC: 312 MOSM/KG (ref 275–295)
P AXIS: 45 DEGREES
P-R INTERVAL: 138 MS
PLATELET # BLD AUTO: 219 10(3)UL (ref 150–450)
POTASSIUM SERPL-SCNC: 4.1 MMOL/L (ref 3.5–5.1)
Q-T INTERVAL: 356 MS
QRS DURATION: 94 MS
QTC CALCULATION (BEZET): 463 MS
R AXIS: -37 DEGREES
RBC # BLD AUTO: 3.69 X10(6)UL
SODIUM SERPL-SCNC: 145 MMOL/L (ref 136–145)
T AXIS: 107 DEGREES
TRIGL SERPL-MCNC: 85 MG/DL (ref 30–149)
TROPONIN I HIGH SENSITIVITY: 57 NG/L
TROPONIN I HIGH SENSITIVITY: 89 NG/L
VENTRICULAR RATE: 102 BPM
VLDLC SERPL CALC-MCNC: 12 MG/DL (ref 0–30)
WBC # BLD AUTO: 9.3 X10(3) UL (ref 4–11)

## 2023-10-17 PROCEDURE — 93005 ELECTROCARDIOGRAM TRACING: CPT

## 2023-10-17 PROCEDURE — 93010 ELECTROCARDIOGRAM REPORT: CPT

## 2023-10-17 PROCEDURE — 71045 X-RAY EXAM CHEST 1 VIEW: CPT | Performed by: EMERGENCY MEDICINE

## 2023-10-17 PROCEDURE — 99285 EMERGENCY DEPT VISIT HI MDM: CPT

## 2023-10-17 PROCEDURE — 80061 LIPID PANEL: CPT | Performed by: EMERGENCY MEDICINE

## 2023-10-17 PROCEDURE — 96374 THER/PROPH/DIAG INJ IV PUSH: CPT

## 2023-10-17 PROCEDURE — 84484 ASSAY OF TROPONIN QUANT: CPT | Performed by: EMERGENCY MEDICINE

## 2023-10-17 PROCEDURE — 83880 ASSAY OF NATRIURETIC PEPTIDE: CPT | Performed by: EMERGENCY MEDICINE

## 2023-10-17 PROCEDURE — 85025 COMPLETE CBC W/AUTO DIFF WBC: CPT | Performed by: EMERGENCY MEDICINE

## 2023-10-17 PROCEDURE — 82962 GLUCOSE BLOOD TEST: CPT

## 2023-10-17 PROCEDURE — 80048 BASIC METABOLIC PNL TOTAL CA: CPT | Performed by: EMERGENCY MEDICINE

## 2023-10-17 RX ORDER — CARVEDILOL 25 MG/1
25 TABLET ORAL 2 TIMES DAILY WITH MEALS
Status: DISCONTINUED | OUTPATIENT
Start: 2023-10-17 | End: 2023-10-17

## 2023-10-17 RX ORDER — CARVEDILOL 12.5 MG/1
12.5 TABLET ORAL 2 TIMES DAILY WITH MEALS
Status: DISCONTINUED | OUTPATIENT
Start: 2023-10-17 | End: 2023-10-19

## 2023-10-17 RX ORDER — ROSUVASTATIN CALCIUM 10 MG/1
10 TABLET, COATED ORAL DAILY
Status: DISCONTINUED | OUTPATIENT
Start: 2023-10-17 | End: 2023-10-19

## 2023-10-17 RX ORDER — GLIPIZIDE 5 MG/1
1 TABLET, FILM COATED, EXTENDED RELEASE ORAL
COMMUNITY

## 2023-10-17 RX ORDER — FUROSEMIDE 10 MG/ML
40 INJECTION INTRAMUSCULAR; INTRAVENOUS ONCE
Status: COMPLETED | OUTPATIENT
Start: 2023-10-17 | End: 2023-10-17

## 2023-10-17 RX ORDER — HEPARIN SODIUM 5000 [USP'U]/ML
5000 INJECTION, SOLUTION INTRAVENOUS; SUBCUTANEOUS EVERY 12 HOURS
Status: DISCONTINUED | OUTPATIENT
Start: 2023-10-17 | End: 2023-10-19

## 2023-10-17 RX ORDER — CLOPIDOGREL BISULFATE 75 MG/1
75 TABLET ORAL DAILY
Status: DISCONTINUED | OUTPATIENT
Start: 2023-10-17 | End: 2023-10-17

## 2023-10-17 RX ORDER — NICOTINE POLACRILEX 4 MG
30 LOZENGE BUCCAL
Status: DISCONTINUED | OUTPATIENT
Start: 2023-10-17 | End: 2023-10-19

## 2023-10-17 RX ORDER — ASPIRIN 81 MG/1
81 TABLET ORAL DAILY
Status: DISCONTINUED | OUTPATIENT
Start: 2023-10-17 | End: 2023-10-19

## 2023-10-17 RX ORDER — DEXTROSE MONOHYDRATE 25 G/50ML
50 INJECTION, SOLUTION INTRAVENOUS
Status: DISCONTINUED | OUTPATIENT
Start: 2023-10-17 | End: 2023-10-19

## 2023-10-17 RX ORDER — LOSARTAN POTASSIUM 50 MG/1
50 TABLET ORAL DAILY
COMMUNITY
Start: 2023-09-22 | End: 2023-10-19

## 2023-10-17 RX ORDER — CARVEDILOL 12.5 MG/1
12.5 TABLET, FILM COATED ORAL 2 TIMES DAILY WITH MEALS
Status: ON HOLD | COMMUNITY
Start: 2023-09-22 | End: 2023-10-19

## 2023-10-17 RX ORDER — NICOTINE POLACRILEX 4 MG
15 LOZENGE BUCCAL
Status: DISCONTINUED | OUTPATIENT
Start: 2023-10-17 | End: 2023-10-19

## 2023-10-17 RX ORDER — LOSARTAN POTASSIUM 25 MG/1
25 TABLET ORAL DAILY
Status: DISCONTINUED | OUTPATIENT
Start: 2023-10-17 | End: 2023-10-19

## 2023-10-17 NOTE — ED QUICK NOTES
This RN assumed care of patient. Patient remains on 2L of O2. Waiting on bed placement. Purewick in place.

## 2023-10-17 NOTE — ED INITIAL ASSESSMENT (HPI)
Pt presents from home for c/o SOB that started tonight. Pt reports being seen recently for the same thing. Pt denies any CP. Pt describes the SOB as not being able to catch her breath making it hard for her to form sentences. NO signs of respiratory distress noted. RR even and unlabored.

## 2023-10-17 NOTE — ED QUICK NOTES
Orders for admission, patient is aware of plan and ready to go upstairs. Any questions, please call ED RN Mary Vaughn at extension [phone].      Patient Covid vaccination status: Partially vaccinated     COVID Test Ordered in ED: None    COVID Suspicion at Admission: N/A    Running Infusions:  None    Mental Status/LOC at time of transport: a&ox4    Other pertinent information:   CIWA score: N/A   NIH score:  N/A

## 2023-10-18 LAB
GLUCOSE BLDC GLUCOMTR-MCNC: 154 MG/DL (ref 70–99)
GLUCOSE BLDC GLUCOMTR-MCNC: 157 MG/DL (ref 70–99)
GLUCOSE BLDC GLUCOMTR-MCNC: 165 MG/DL (ref 70–99)
GLUCOSE BLDC GLUCOMTR-MCNC: 184 MG/DL (ref 70–99)
GLUCOSE BLDC GLUCOMTR-MCNC: 58 MG/DL (ref 70–99)
GLUCOSE BLDC GLUCOMTR-MCNC: 92 MG/DL (ref 70–99)

## 2023-10-18 PROCEDURE — 82962 GLUCOSE BLOOD TEST: CPT

## 2023-10-18 NOTE — PLAN OF CARE
Pt reported that she is feeling much better. O2 saturating at 93% when awake but placed on nasal cannula at 1 L when sleeping d/t desaturation. Pt offered CPAP by RT but refused it last night. No reports of SOB, difficulty of breathing or any discomfort.      Problem: Patient Centered Care  Goal: Patient preferences are identified and integrated in the patient's plan of care  Description: Interventions:  - What would you like us to know as we care for you?   - Provide timely, complete, and accurate information to patient/family  - Incorporate patient and family knowledge, values, beliefs, and cultural backgrounds into the planning and delivery of care  - Encourage patient/family to participate in care and decision-making at the level they choose  - Honor patient and family perspectives and choices  Outcome: Progressing     Problem: Diabetes/Glucose Control  Goal: Glucose maintained within prescribed range  Description: INTERVENTIONS:  - Monitor Blood Glucose as ordered  - Assess for signs and symptoms of hyperglycemia and hypoglycemia  - Administer ordered medications to maintain glucose within target range  - Assess barriers to adequate nutritional intake and initiate nutrition consult as needed  - Instruct patient on self management of diabetes  Outcome: Progressing     Problem: Patient/Family Goals  Goal: Patient/Family Long Term Goal  Description: Patient's Long Term Goal: To go home    Interventions:  - IV Lasix, cardiac monitoring  - See additional Care Plan goals for specific interventions  Outcome: Progressing  Goal: Patient/Family Short Term Goal  Description: Patient's Short Term Goal: To breathe better    Interventions:   - IV Lasix, cardiac monitoring and HF management per Cardio  - See additional Care Plan goals for specific interventions  Outcome: Progressing     Problem: RESPIRATORY - ADULT  Goal: Achieves optimal ventilation and oxygenation  Description: INTERVENTIONS:  - Assess for changes in respiratory status  - Assess for changes in mentation and behavior  - Position to facilitate oxygenation and minimize respiratory effort  - Oxygen supplementation based on oxygen saturation or ABGs  - Provide Smoking Cessation handout, if applicable  - Encourage broncho-pulmonary hygiene including cough, deep breathe, Incentive Spirometry  - Assess the need for suctioning and perform as needed  - Assess and instruct to report SOB or any respiratory difficulty  - Respiratory Therapy support as indicated  - Manage/alleviate anxiety  - Monitor for signs/symptoms of CO2 retention  Outcome: Progressing     Problem: CARDIOVASCULAR - ADULT  Goal: Maintains optimal cardiac output and hemodynamic stability  Description: INTERVENTIONS:  - Monitor vital signs, rhythm, and trends  - Monitor for bleeding, hypotension and signs of decreased cardiac output  - Evaluate effectiveness of vasoactive medications to optimize hemodynamic stability  - Monitor arterial and/or venous puncture sites for bleeding and/or hematoma  - Assess quality of pulses, skin color and temperature  - Assess for signs of decreased coronary artery perfusion - ex.  Angina  - Evaluate fluid balance, assess for edema, trend weights  Outcome: Progressing  Goal: Absence of cardiac arrhythmias or at baseline  Description: INTERVENTIONS:  - Continuous cardiac monitoring, monitor vital signs, obtain 12 lead EKG if indicated  - Evaluate effectiveness of antiarrhythmic and heart rate control medications as ordered  - Initiate emergency measures for life threatening arrhythmias  - Monitor electrolytes and administer replacement therapy as ordered  Outcome: Progressing

## 2023-10-18 NOTE — PLAN OF CARE
AxOx4, forgetful at times, RA, NSR, Cont with accidents r/t diuresis, 1-2 assist with walker, ACHS. Pt had hypoglycemic episode this AM and was given 1 bottle of glucose drink. Will follow protocol. Plan is diuresis   Problem: Patient Centered Care  Goal: Patient preferences are identified and integrated in the patient's plan of care  Description: Interventions:  - What would you like us to know as we care for you?  From home with family  - Provide timely, complete, and accurate information to patient/family  - Incorporate patient and family knowledge, values, beliefs, and cultural backgrounds into the planning and delivery of care  - Encourage patient/family to participate in care and decision-making at the level they choose  - Honor patient and family perspectives and choices  Outcome: Progressing     Problem: Diabetes/Glucose Control  Goal: Glucose maintained within prescribed range  Description: INTERVENTIONS:  - Monitor Blood Glucose as ordered  - Assess for signs and symptoms of hyperglycemia and hypoglycemia  - Administer ordered medications to maintain glucose within target range  - Assess barriers to adequate nutritional intake and initiate nutrition consult as needed  - Instruct patient on self management of diabetes  Outcome: Progressing     Problem: Patient/Family Goals  Goal: Patient/Family Long Term Goal  Description: Patient's Long Term Goal: To go home    Interventions:  - IV Lasix, cardiac monitoring  - See additional Care Plan goals for specific interventions  Outcome: Progressing  Goal: Patient/Family Short Term Goal  Description: Patient's Short Term Goal: To breathe better    Interventions:   - IV Lasix, cardiac monitoring and HF management per Cardio  - See additional Care Plan goals for specific interventions  Outcome: Progressing     Problem: RESPIRATORY - ADULT  Goal: Achieves optimal ventilation and oxygenation  Description: INTERVENTIONS:  - Assess for changes in respiratory status  - Assess for changes in mentation and behavior  - Position to facilitate oxygenation and minimize respiratory effort  - Oxygen supplementation based on oxygen saturation or ABGs  - Provide Smoking Cessation handout, if applicable  - Encourage broncho-pulmonary hygiene including cough, deep breathe, Incentive Spirometry  - Assess the need for suctioning and perform as needed  - Assess and instruct to report SOB or any respiratory difficulty  - Respiratory Therapy support as indicated  - Manage/alleviate anxiety  - Monitor for signs/symptoms of CO2 retention  Outcome: Progressing     Problem: CARDIOVASCULAR - ADULT  Goal: Maintains optimal cardiac output and hemodynamic stability  Description: INTERVENTIONS:  - Monitor vital signs, rhythm, and trends  - Monitor for bleeding, hypotension and signs of decreased cardiac output  - Evaluate effectiveness of vasoactive medications to optimize hemodynamic stability  - Monitor arterial and/or venous puncture sites for bleeding and/or hematoma  - Assess quality of pulses, skin color and temperature  - Assess for signs of decreased coronary artery perfusion - ex.  Angina  - Evaluate fluid balance, assess for edema, trend weights  Outcome: Progressing  Goal: Absence of cardiac arrhythmias or at baseline  Description: INTERVENTIONS:  - Continuous cardiac monitoring, monitor vital signs, obtain 12 lead EKG if indicated  - Evaluate effectiveness of antiarrhythmic and heart rate control medications as ordered  - Initiate emergency measures for life threatening arrhythmias  - Monitor electrolytes and administer replacement therapy as ordered  Outcome: Progressing

## 2023-10-18 NOTE — CDS QUERY
.Clarification - Chronic Kidney Disease and/or CKD Specificity  CLINICAL DOCUMENTATION CLARIFICATION FORM  Dear Doctor: [facility] information (provided below) includes Chronic Kidney Disease and/or CKD requiring clarification. For accurate ICD-10-CM code assignment to reflect severity of illness and risk of mortality,   PLEASE (X) ALL DIAGNOSES THAT APPLY. SELECTION BY PROVIDER ONLY    ( )  Chronic Kidney Disease Stage 1 (GFR > 90 )     ( )  Chronic Kidney Disease Stage 2 (mild) (Gfr of 60-89 )    ( x)  Chronic Kidney Disease Stage 3 (moderate) (GFR 30-59)     ( )  Chronic Kidney Disease Stage 4 (severe) (GFR 15-29)     ( )  Chronic Kidney Disease Stage 5 (GFR less than 15    ( )  Other (please specify):          Documentation includes Chronic Kidney Disease and/or CKD:   Currently admitted for a CHF exacerbation   H&P states Chronic kidney disease   GFR on 10/17/23 is 36  GFR on 7/6/2023 was 33  Risk Factors: CHF, HTN     If you have any questions, please contact Clinical :  Jaymie Weiss RN at [phone]     Thank You!     THIS FORM IS A PERMANENT PART OF THE MEDICAL RECORD

## 2023-10-19 VITALS
RESPIRATION RATE: 14 BRPM | HEIGHT: 67 IN | WEIGHT: 128.69 LBS | DIASTOLIC BLOOD PRESSURE: 64 MMHG | BODY MASS INDEX: 20.2 KG/M2 | TEMPERATURE: 99 F | HEART RATE: 80 BPM | OXYGEN SATURATION: 97 % | SYSTOLIC BLOOD PRESSURE: 142 MMHG

## 2023-10-19 LAB
GLUCOSE BLDC GLUCOMTR-MCNC: 169 MG/DL (ref 70–99)
GLUCOSE BLDC GLUCOMTR-MCNC: 99 MG/DL (ref 70–99)

## 2023-10-19 PROCEDURE — 82962 GLUCOSE BLOOD TEST: CPT

## 2023-10-19 RX ORDER — LOSARTAN POTASSIUM 25 MG/1
25 TABLET ORAL DAILY
Qty: 30 TABLET | Refills: 2 | Status: SHIPPED | OUTPATIENT
Start: 2023-10-19

## 2023-10-19 RX ORDER — FUROSEMIDE 20 MG/1
20 TABLET ORAL DAILY
Qty: 30 TABLET | Refills: 0 | Status: SHIPPED | OUTPATIENT
Start: 2023-10-19

## 2023-10-19 RX ORDER — CARVEDILOL 12.5 MG/1
12.5 TABLET, FILM COATED ORAL 2 TIMES DAILY WITH MEALS
Qty: 30 TABLET | Refills: 0 | Status: SHIPPED | OUTPATIENT
Start: 2023-10-19

## 2023-10-19 NOTE — PLAN OF CARE
Pt received in no acute distress, no c/o, plan is discharge pending medical clearance. MD Chaz Roman paged- Per MD standpoint pt can discharge home, no order for oral diuretics at this time. Pt to follow up in the office outpatient. MD Rachel [address] paged for discharge orders. Problem: Patient Centered Care  Goal: Patient preferences are identified and integrated in the patient's plan of care  Description: Interventions:  - What would you like us to know as we care for you?  From home with family  - Provide timely, complete, and accurate information to patient/family  - Incorporate patient and family knowledge, values, beliefs, and cultural backgrounds into the planning and delivery of care  - Encourage patient/family to participate in care and decision-making at the level they choose  - Honor patient and family perspectives and choices  Outcome: Adequate for Discharge     Problem: Diabetes/Glucose Control  Goal: Glucose maintained within prescribed range  Description: INTERVENTIONS:  - Monitor Blood Glucose as ordered  - Assess for signs and symptoms of hyperglycemia and hypoglycemia  - Administer ordered medications to maintain glucose within target range  - Assess barriers to adequate nutritional intake and initiate nutrition consult as needed  - Instruct patient on self management of diabetes  Outcome: Adequate for Discharge     Problem: Patient/Family Goals  Goal: Patient/Family Long Term Goal  Description: Patient's Long Term Goal: To go home    Interventions:  - IV Lasix, cardiac monitoring  - See additional Care Plan goals for specific interventions  Outcome: Adequate for Discharge  Goal: Patient/Family Short Term Goal  Description: Patient's Short Term Goal: To breathe better    Interventions:   - IV Lasix, cardiac monitoring and HF management per Cardio  - See additional Care Plan goals for specific interventions  Outcome: Adequate for Discharge     Problem: RESPIRATORY - ADULT  Goal: Achieves optimal ventilation and oxygenation  Description: INTERVENTIONS:  - Assess for changes in respiratory status  - Assess for changes in mentation and behavior  - Position to facilitate oxygenation and minimize respiratory effort  - Oxygen supplementation based on oxygen saturation or ABGs  - Provide Smoking Cessation handout, if applicable  - Encourage broncho-pulmonary hygiene including cough, deep breathe, Incentive Spirometry  - Assess the need for suctioning and perform as needed  - Assess and instruct to report SOB or any respiratory difficulty  - Respiratory Therapy support as indicated  - Manage/alleviate anxiety  - Monitor for signs/symptoms of CO2 retention  Outcome: Adequate for Discharge     Problem: CARDIOVASCULAR - ADULT  Goal: Maintains optimal cardiac output and hemodynamic stability  Description: INTERVENTIONS:  - Monitor vital signs, rhythm, and trends  - Monitor for bleeding, hypotension and signs of decreased cardiac output  - Evaluate effectiveness of vasoactive medications to optimize hemodynamic stability  - Monitor arterial and/or venous puncture sites for bleeding and/or hematoma  - Assess quality of pulses, skin color and temperature  - Assess for signs of decreased coronary artery perfusion - ex.  Angina  - Evaluate fluid balance, assess for edema, trend weights  Outcome: Adequate for Discharge  Goal: Absence of cardiac arrhythmias or at baseline  Description: INTERVENTIONS:  - Continuous cardiac monitoring, monitor vital signs, obtain 12 lead EKG if indicated  - Evaluate effectiveness of antiarrhythmic and heart rate control medications as ordered  - Initiate emergency measures for life threatening arrhythmias  - Monitor electrolytes and administer replacement therapy as ordered  Outcome: Adequate for Discharge

## 2023-10-19 NOTE — PROGRESS NOTES
Patient verbalized understanding of discharge instructions, All questions answered to patient's satisfaction. Tele and PIV removed. All personal belongings including discharge paperwork and prescriptions packed and taken with patient. Patient escorted via wheelchair to private vehicle.        Jah Truong RN

## 2023-10-19 NOTE — PLAN OF CARE
No complaints of pain overnight, pt slept. On 1L NC overnight for comfort. Safety precautions maintained and in place. Plan to continue diuretics and discharge home once medically cleared. Problem: Patient Centered Care  Goal: Patient preferences are identified and integrated in the patient's plan of care  Description: Interventions:  - What would you like us to know as we care for you?  From home with family  - Provide timely, complete, and accurate information to patient/family  - Incorporate patient and family knowledge, values, beliefs, and cultural backgrounds into the planning and delivery of care  - Encourage patient/family to participate in care and decision-making at the level they choose  - Honor patient and family perspectives and choices  Outcome: Progressing     Problem: Diabetes/Glucose Control  Goal: Glucose maintained within prescribed range  Description: INTERVENTIONS:  - Monitor Blood Glucose as ordered  - Assess for signs and symptoms of hyperglycemia and hypoglycemia  - Administer ordered medications to maintain glucose within target range  - Assess barriers to adequate nutritional intake and initiate nutrition consult as needed  - Instruct patient on self management of diabetes  Outcome: Progressing     Problem: Patient/Family Goals  Goal: Patient/Family Long Term Goal  Description: Patient's Long Term Goal: To go home    Interventions:  - IV Lasix, cardiac monitoring  - See additional Care Plan goals for specific interventions  Outcome: Progressing  Goal: Patient/Family Short Term Goal  Description: Patient's Short Term Goal: To breathe better    Interventions:   - IV Lasix, cardiac monitoring and HF management per Cardio  - See additional Care Plan goals for specific interventions  Outcome: Progressing     Problem: RESPIRATORY - ADULT  Goal: Achieves optimal ventilation and oxygenation  Description: INTERVENTIONS:  - Assess for changes in respiratory status  - Assess for changes in mentation and behavior  - Position to facilitate oxygenation and minimize respiratory effort  - Oxygen supplementation based on oxygen saturation or ABGs  - Provide Smoking Cessation handout, if applicable  - Encourage broncho-pulmonary hygiene including cough, deep breathe, Incentive Spirometry  - Assess the need for suctioning and perform as needed  - Assess and instruct to report SOB or any respiratory difficulty  - Respiratory Therapy support as indicated  - Manage/alleviate anxiety  - Monitor for signs/symptoms of CO2 retention  Outcome: Progressing     Problem: CARDIOVASCULAR - ADULT  Goal: Maintains optimal cardiac output and hemodynamic stability  Description: INTERVENTIONS:  - Monitor vital signs, rhythm, and trends  - Monitor for bleeding, hypotension and signs of decreased cardiac output  - Evaluate effectiveness of vasoactive medications to optimize hemodynamic stability  - Monitor arterial and/or venous puncture sites for bleeding and/or hematoma  - Assess quality of pulses, skin color and temperature  - Assess for signs of decreased coronary artery perfusion - ex.  Angina  - Evaluate fluid balance, assess for edema, trend weights  Outcome: Progressing  Goal: Absence of cardiac arrhythmias or at baseline  Description: INTERVENTIONS:  - Continuous cardiac monitoring, monitor vital signs, obtain 12 lead EKG if indicated  - Evaluate effectiveness of antiarrhythmic and heart rate control medications as ordered  - Initiate emergency measures for life threatening arrhythmias  - Monitor electrolytes and administer replacement therapy as ordered  Outcome: Progressing

## 2023-10-20 NOTE — PAYOR COMM NOTE
--------------  DISCHARGE REVIEW    Payor: [employer]  Subscriber #:  [id number]  Authorization Number: [id number]    Admit date: 10/17/23  Admit time:  12:47 PM  Discharge Date: 10/19/2023  3:19 PM     Admitting Physician: Mariah Penn MD  Attending Physician:  No att. providers found  Primary Care Physician: Katie Stewart       Discharge Summary Notes    No notes of this type exist for this encounter.          REVIEWER COMMENTS

## (undated) NOTE — LETTER
No information on file. Consent for Diagnostic Services         Date of Procedure: The physician has ordered a * No procedures found * to determine heart function. The information obtained will aid in detecting the possible presence of heart disease, to determine why related symptoms may be occurring, and to determine an appropriate plan of medical management. The risks associated with any exercise test or pharmacological stress test are similar to the risks associated with exercise, including an abnormal blood pressure, dizziness, fainting, abnormal heart rate and in very rare instances, heart attack, stroke, or death. During the test, every effort will be made to minimize such risks by careful observation, however any unusual feeling or symptoms experienced should be reported. Emergency equipment and trained personnel are available to assist with unusual situations, which may arise, and these personnel have permission to perform treatment. During the treadmill or nuclear stress test, the exercise intensity begins at a low level and advances in stages depending on fitness level. The test may stop at any time because of signs of fatigue, changes in heart rate, electrocardiogram, or blood pressure, or any other symptoms experienced. If a pharmacological stress test has been ordered, symptoms may include: headache, shortness of breath, flushing, warmth, rapid heart rate, or a bitter taste in the mouth. Sometimes symptoms may not be experienced. Prompt reporting of any symptoms is very important. This could include either regadenoson or dobutamine. During a regadenoson stress test, an injection of regadenoson is administered. Immediately after the regadenoson is given, there is an injection of a radioactive isotope. During a dobutamine stress test, dobutamine infuses over approximately fifteen minutes. A radioactive isotope is injected during the infusion.  After either pharmacological stress test, a nuclear scan or an echocardiogram will be performed. The information obtained during testing will be treated as privileged and confidential. The information obtained will be given to my doctor, may be used for insurance purposes or to track and trend data as part of  with privacy retained. I have read and understand the above information. I voluntarily agree and consent to the above ordered test. Furthermore, no guarantees or assurances have been given by anyone as to the results that may be obtained from this procedure. Any questions that may have occurred to me have been answered to my satisfaction.       [  ]  Patient denies pregnancy or not applicable     [  ]  Patient denies breastfeeding    Signature of Patient:   _______________________________________________________________  Responsible person in case of minor, unconscious: ________________________________________  Relationship to patient: ______________________________________________________________    Signature of Witness: _________________________________Date: ___________Time: __________      Patient Name: Damaso Sabillon : 1940  Printed: 2023   Medical Record #: [id number]

## (undated) NOTE — LETTER
No information on file. Consent for Diagnostic Services         Date of Procedure: 07/05/2023    The physician has ordered a CARDIAC NUCLEAR STRESS TEST LEXISCAN to determine heart function. The information obtained will aid in detecting the possible presence of heart disease, to determine why related symptoms may be occurring, and to determine an appropriate plan of medical management. The risks associated with any exercise test or pharmacological stress test are similar to the risks associated with exercise, including an abnormal blood pressure, dizziness, fainting, abnormal heart rate and in very rare instances, heart attack, stroke, or death. During the test, every effort will be made to minimize such risks by careful observation, however any unusual feeling or symptoms experienced should be reported. Emergency equipment and trained personnel are available to assist with unusual situations, which may arise, and these personnel have permission to perform treatment. During the treadmill or nuclear stress test, the exercise intensity begins at a low level and advances in stages depending on fitness level. The test may stop at any time because of signs of fatigue, changes in heart rate, electrocardiogram, or blood pressure, or any other symptoms experienced. If a pharmacological stress test has been ordered, symptoms may include: headache, shortness of breath, flushing, warmth, rapid heart rate, or a bitter taste in the mouth. Sometimes symptoms may not be experienced. Prompt reporting of any symptoms is very important. This could include either regadenoson or dobutamine. During a regadenoson stress test, an injection of regadenoson is administered. Immediately after the regadenoson is given, there is an injection of a radioactive isotope. During a dobutamine stress test, dobutamine infuses over approximately fifteen minutes. A radioactive isotope is injected during the infusion.  After either pharmacological stress test, a nuclear scan or an echocardiogram will be performed. The information obtained during testing will be treated as privileged and confidential. The information obtained will be given to my doctor, may be used for insurance purposes or to track and trend data as part of  with privacy retained. I have read and understand the above information. I voluntarily agree and consent to the above ordered test. Furthermore, no guarantees or assurances have been given by anyone as to the results that may be obtained from this procedure. Any questions that may have occurred to me have been answered to my satisfaction.       [ x ]  Patient denies pregnancy or not applicable     [ x ]  Patient denies breastfeeding    Signature of Patient:   _______________________________________________________________  Responsible person in case of minor, unconscious: ________________________________________  Relationship to patient: ______________________________________________________________    Signature of Witness: _________________________________Date: ___________Time: __________      Patient Name: Ramos Whitley : 1940  Printed: 2023   Medical Record #: [id number]